# Patient Record
Sex: MALE | Race: WHITE | NOT HISPANIC OR LATINO | Employment: FULL TIME | URBAN - METROPOLITAN AREA
[De-identification: names, ages, dates, MRNs, and addresses within clinical notes are randomized per-mention and may not be internally consistent; named-entity substitution may affect disease eponyms.]

---

## 2021-10-12 ENCOUNTER — APPOINTMENT (OUTPATIENT)
Dept: GENERAL RADIOLOGY | Facility: CLINIC | Age: 21
End: 2021-10-12
Attending: EMERGENCY MEDICINE
Payer: COMMERCIAL

## 2021-10-12 ENCOUNTER — NURSE TRIAGE (OUTPATIENT)
Dept: NURSING | Facility: CLINIC | Age: 21
End: 2021-10-12

## 2021-10-12 ENCOUNTER — HOSPITAL ENCOUNTER (OUTPATIENT)
Facility: CLINIC | Age: 21
Setting detail: OBSERVATION
Discharge: HOME | End: 2021-10-13
Attending: EMERGENCY MEDICINE | Admitting: NURSE PRACTITIONER
Payer: COMMERCIAL

## 2021-10-12 DIAGNOSIS — Z20.822 CONTACT WITH AND (SUSPECTED) EXPOSURE TO COVID-19: ICD-10-CM

## 2021-10-12 DIAGNOSIS — J93.83 SPONTANEOUS PNEUMOTHORAX: ICD-10-CM

## 2021-10-12 LAB
ANION GAP SERPL CALCULATED.3IONS-SCNC: 2 MMOL/L (ref 3–14)
BASOPHILS # BLD AUTO: 0 10E3/UL (ref 0–0.2)
BASOPHILS NFR BLD AUTO: 1 %
BUN SERPL-MCNC: 13 MG/DL (ref 7–30)
CALCIUM SERPL-MCNC: 8.8 MG/DL (ref 8.5–10.1)
CHLORIDE BLD-SCNC: 108 MMOL/L (ref 94–109)
CO2 SERPL-SCNC: 29 MMOL/L (ref 20–32)
CREAT SERPL-MCNC: 1.22 MG/DL (ref 0.66–1.25)
CRP SERPL-MCNC: <2.9 MG/L (ref 0–8)
EOSINOPHIL # BLD AUTO: 0.2 10E3/UL (ref 0–0.7)
EOSINOPHIL NFR BLD AUTO: 3 %
ERYTHROCYTE [DISTWIDTH] IN BLOOD BY AUTOMATED COUNT: 12 % (ref 10–15)
ERYTHROCYTE [SEDIMENTATION RATE] IN BLOOD BY WESTERGREN METHOD: 7 MM/HR (ref 0–15)
GFR SERPL CREATININE-BSD FRML MDRD: 83 ML/MIN/1.73M2
GLUCOSE BLD-MCNC: 92 MG/DL (ref 70–99)
HCT VFR BLD AUTO: 42.2 % (ref 40–53)
HGB BLD-MCNC: 14.6 G/DL (ref 13.3–17.7)
HOLD SPECIMEN: NORMAL
IMM GRANULOCYTES # BLD: 0 10E3/UL
IMM GRANULOCYTES NFR BLD: 0 %
LYMPHOCYTES # BLD AUTO: 1.4 10E3/UL (ref 0.8–5.3)
LYMPHOCYTES NFR BLD AUTO: 26 %
MCH RBC QN AUTO: 31.5 PG (ref 26.5–33)
MCHC RBC AUTO-ENTMCNC: 34.6 G/DL (ref 31.5–36.5)
MCV RBC AUTO: 91 FL (ref 78–100)
MONOCYTES # BLD AUTO: 0.5 10E3/UL (ref 0–1.3)
MONOCYTES NFR BLD AUTO: 9 %
NEUTROPHILS # BLD AUTO: 3.2 10E3/UL (ref 1.6–8.3)
NEUTROPHILS NFR BLD AUTO: 61 %
NRBC # BLD AUTO: 0 10E3/UL
NRBC BLD AUTO-RTO: 0 /100
PLATELET # BLD AUTO: 297 10E3/UL (ref 150–450)
POTASSIUM BLD-SCNC: 3.9 MMOL/L (ref 3.4–5.3)
RBC # BLD AUTO: 4.64 10E6/UL (ref 4.4–5.9)
SARS-COV-2 RNA RESP QL NAA+PROBE: NEGATIVE
SODIUM SERPL-SCNC: 139 MMOL/L (ref 133–144)
TROPONIN I SERPL-MCNC: <0.015 UG/L (ref 0–0.04)
WBC # BLD AUTO: 5.2 10E3/UL (ref 4–11)

## 2021-10-12 PROCEDURE — 86140 C-REACTIVE PROTEIN: CPT | Performed by: EMERGENCY MEDICINE

## 2021-10-12 PROCEDURE — G0378 HOSPITAL OBSERVATION PER HR: HCPCS

## 2021-10-12 PROCEDURE — 80048 BASIC METABOLIC PNL TOTAL CA: CPT | Performed by: EMERGENCY MEDICINE

## 2021-10-12 PROCEDURE — 93010 ELECTROCARDIOGRAM REPORT: CPT | Performed by: EMERGENCY MEDICINE

## 2021-10-12 PROCEDURE — 85025 COMPLETE CBC W/AUTO DIFF WBC: CPT | Performed by: EMERGENCY MEDICINE

## 2021-10-12 PROCEDURE — 99285 EMERGENCY DEPT VISIT HI MDM: CPT | Mod: 25 | Performed by: EMERGENCY MEDICINE

## 2021-10-12 PROCEDURE — 71046 X-RAY EXAM CHEST 2 VIEWS: CPT

## 2021-10-12 PROCEDURE — 36415 COLL VENOUS BLD VENIPUNCTURE: CPT | Performed by: EMERGENCY MEDICINE

## 2021-10-12 PROCEDURE — 85652 RBC SED RATE AUTOMATED: CPT | Performed by: EMERGENCY MEDICINE

## 2021-10-12 PROCEDURE — U0003 INFECTIOUS AGENT DETECTION BY NUCLEIC ACID (DNA OR RNA); SEVERE ACUTE RESPIRATORY SYNDROME CORONAVIRUS 2 (SARS-COV-2) (CORONAVIRUS DISEASE [COVID-19]), AMPLIFIED PROBE TECHNIQUE, MAKING USE OF HIGH THROUGHPUT TECHNOLOGIES AS DESCRIBED BY CMS-2020-01-R: HCPCS | Performed by: EMERGENCY MEDICINE

## 2021-10-12 PROCEDURE — C9803 HOPD COVID-19 SPEC COLLECT: HCPCS | Performed by: EMERGENCY MEDICINE

## 2021-10-12 PROCEDURE — 84484 ASSAY OF TROPONIN QUANT: CPT | Performed by: EMERGENCY MEDICINE

## 2021-10-12 PROCEDURE — 93005 ELECTROCARDIOGRAM TRACING: CPT | Performed by: EMERGENCY MEDICINE

## 2021-10-12 PROCEDURE — 71046 X-RAY EXAM CHEST 2 VIEWS: CPT | Mod: 26 | Performed by: RADIOLOGY

## 2021-10-12 RX ORDER — IBUPROFEN 200 MG
600 TABLET ORAL EVERY 6 HOURS PRN
COMMUNITY
End: 2022-01-06

## 2021-10-12 ASSESSMENT — ENCOUNTER SYMPTOMS: SHORTNESS OF BREATH: 1

## 2021-10-12 NOTE — ED PROVIDER NOTES
ED Provider Note  [facility]      History     Chief Complaint   Patient presents with     Chest Pain     The history is provided by the patient.     Trevor Cardenas is a 21 year old male who has no significant medical history presents to the ED with c/o persistent left-sided chest pain beginning 2 days ago.  Patient reports experiencing bilateral chest pain, sweating, and tingling in fingers when he woke up 2 days ago.  Patient reports laying down for a while after this episode and symptoms diminished.  Patient states that right sided chest pain has ceased left-sided chest pain has persisted.  Patient states that pain relieves while sitting up and leaning forward.  Patient states that pain worsens when lying down left side.  Patient describes the pain as mild, dull pressure which feels constricting.  Patient states that pain radiates to left side of his back.  Patient reports taking 3 tablets of ibuprofen per day for pain, without relief.  Patient states he was recently sick with a cold 1 week ago and reports a negative COVID-19 test result during this time.  He denies feeling cold symptoms today. Patient denies experiencing any fevers.  Patient denies any chronic medical problems or history of cardiac issues in the family.  Patient denies history of PE or DVT.  Denies traveling for an extensive period of time recently.  Patient reports taking Claritin daily.  Patient reports smoking cannabis daily.     Past Medical History  No past medical history on file.  No past surgical history on file.  ibuprofen (ADVIL/MOTRIN) 200 MG tablet      No Known Allergies  Family History  No family history on file.  Social History   Social History     Tobacco Use     Smoking status: Not on file   Substance Use Topics     Alcohol use: Not on file     Drug use: Not on file      Past medical history, past surgical history, medications, allergies, family history, and social history were reviewed with  the patient. No additional pertinent items.       Review of Systems   Respiratory: Positive for shortness of breath.    Cardiovascular: Positive for chest pain.   All other systems reviewed and are negative.    A complete review of systems was performed with pertinent positives and negatives noted in the HPI, and all other systems negative.    Physical Exam   BP: (!) 157/86  Pulse: 97  Temp: 98.7  F (37.1  C)  Resp: 18  Weight: 65.8 kg (145 lb)  SpO2: 97 %  Physical Exam  Constitutional:       General: He is not in acute distress.     Appearance: He is well-developed.   HENT:      Head: Normocephalic and atraumatic.   Cardiovascular:      Rate and Rhythm: Normal rate and regular rhythm.      Heart sounds: Normal heart sounds. No friction rub.   Pulmonary:      Effort: Pulmonary effort is normal.      Breath sounds: Normal breath sounds.   Abdominal:      Palpations: Abdomen is soft.      Tenderness: There is no abdominal tenderness. There is no guarding or rebound.   Musculoskeletal:         General: Normal range of motion.      Cervical back: Normal range of motion.   Skin:     General: Skin is warm.   Neurological:      General: No focal deficit present.      Mental Status: He is alert and oriented to person, place, and time.         ED Course     5:48 PM  The patient was seen and examined by Toñito Dueñas MD in Room ED26.     Procedures            EKG Interpretation:      Interpreted by Toñito Dueñas MD  Time reviewed: 1720  Symptoms at time of EKG: CP   Rhythm: normal sinus   Rate: 86  Axis: Normal  Ectopy: none  Conduction: right bundle branch block (incomplete)  ST Segments/ T Waves: No acute ischemic changes  Q Waves: none  Comparison to prior: No old EKG available    Clinical Impression: normal EKG       Results for orders placed or performed during the hospital encounter of 10/12/21   XR Chest 2 Views     Status: None    Narrative    EXAM: XR CHEST 2 VW  LOCATION: [facility]  MEDICAL CENTER  DATE/TIME: 10/12/2021 6:20 PM    INDICATION: chest pain  COMPARISON: None.      Impression    IMPRESSION:   Small left apical pneumothorax measuring 17 mm in diameter. No significant mediastinal shift.    The chest is otherwise negative. No displaced rib fracture, focal pulmonary infiltrate or pleural effusion.    NOTE: ABNORMAL REPORT    THE DICTATION ABOVE DESCRIBES AN ABNORMALITY FOR WHICH FOLLOW-UP IS NEEDED.    Troponin I (now + 6 & 12 hrs)     Status: Normal   Result Value Ref Range    Troponin I <0.015 0.000 - 0.045 ug/L   Extra Blue Top Tube     Status: None   Result Value Ref Range    Hold Specimen JIC    Extra Red Top Tube     Status: None   Result Value Ref Range    Hold Specimen JIC    Extra Purple Top Tube     Status: None   Result Value Ref Range    Hold Specimen JIC    Basic metabolic panel     Status: Abnormal   Result Value Ref Range    Sodium 139 133 - 144 mmol/L    Potassium 3.9 3.4 - 5.3 mmol/L    Chloride 108 94 - 109 mmol/L    Carbon Dioxide (CO2) 29 20 - 32 mmol/L    Anion Gap 2 (L) 3 - 14 mmol/L    Urea Nitrogen 13 7 - 30 mg/dL    Creatinine 1.22 0.66 - 1.25 mg/dL    Calcium 8.8 8.5 - 10.1 mg/dL    Glucose 92 70 - 99 mg/dL    GFR Estimate 83 >60 mL/min/1.73m2   CRP inflammation     Status: Normal   Result Value Ref Range    CRP Inflammation <2.9 0.0 - 8.0 mg/L   Erythrocyte sedimentation rate auto     Status: Normal   Result Value Ref Range    Erythrocyte Sedimentation Rate 7 0 - 15 mm/hr   CBC with platelets and differential     Status: None   Result Value Ref Range    WBC Count 5.2 4.0 - 11.0 10e3/uL    RBC Count 4.64 4.40 - 5.90 10e6/uL    Hemoglobin 14.6 13.3 - 17.7 g/dL    Hematocrit 42.2 40.0 - 53.0 %    MCV 91 78 - 100 fL    MCH 31.5 26.5 - 33.0 pg    MCHC 34.6 31.5 - 36.5 g/dL    RDW 12.0 10.0 - 15.0 %    Platelet Count 297 150 - 450 10e3/uL    % Neutrophils 61 %    % Lymphocytes 26 %    % Monocytes 9 %    % Eosinophils 3 %    % Basophils 1 %    % Immature Granulocytes  0 %    NRBCs per 100 WBC 0 <1 /100    Absolute Neutrophils 3.2 1.6 - 8.3 10e3/uL    Absolute Lymphocytes 1.4 0.8 - 5.3 10e3/uL    Absolute Monocytes 0.5 0.0 - 1.3 10e3/uL    Absolute Eosinophils 0.2 0.0 - 0.7 10e3/uL    Absolute Basophils 0.0 0.0 - 0.2 10e3/uL    Absolute Immature Granulocytes 0.0 <=0.0 10e3/uL    Absolute NRBCs 0.0 10e3/uL   EKG 12 lead     Status: None (Preliminary result)   Result Value Ref Range    Systolic Blood Pressure  mmHg    Diastolic Blood Pressure  mmHg    Ventricular Rate 86 BPM    Atrial Rate 86 BPM    AL Interval 148 ms    QRS Duration 110 ms     ms    QTc 430 ms    P Axis 79 degrees    R AXIS 53 degrees    T Axis 64 degrees    Interpretation ECG       Sinus rhythm  Incomplete right bundle branch block  Borderline ECG     [place] Draw     Status: None    Narrative    The following orders were created for panel order [place] Draw.  Procedure                               Abnormality         Status                     ---------                               -----------         ------                     Extra Blue Top Tube[[id number]]                              Final result               Extra Red Top Tube[[id number]]                               Final result               Extra Purple Top Tube[[id number]]                            Final result                 Please view results for these tests on the individual orders.   CBC with platelets differential     Status: None    Narrative    The following orders were created for panel order CBC with platelets differential.  Procedure                               Abnormality         Status                     ---------                               -----------         ------                     CBC with platelets and d...[[id number]]                      Final result                 Please view results for these tests on the individual orders.     Medications - No data to display     Assessments & Plan (with Medical Decision Making)    Patient nontoxic in no acute distress presents for chest pain.  He is young otherwise healthy denies any cardiovascular or PE risk factors.  Exam is benign.  EKG was sinus rhythm with an incomplete right bundle no signs for ischemic changes.  Laboratory work was obtained and unremarkable.  Thought he could possibly have with his recent viral illness pericarditis but no signs on EKG ESR and CRP are negative troponin negative.  Chest x-ray however does show a small apical left-sided pneumothorax.  Patient is vitally stable not hypoxic in no respiratory distress.  Patient will be placed on oxygen discussed with ED observation and pulmonology for repeat imaging in the morning oxygen overnight and monitoring.    I have reviewed the nursing notes. I have reviewed the findings, diagnosis, plan and need for follow up with the patient.    New Prescriptions    No medications on file       Final diagnoses:   Spontaneous pneumothorax     IAnup, am serving as a trained medical scribe to document services personally performed by Toñito Dueñas MD, based on the provider's statements to me.      IToñito MD, was physically present and have reviewed and verified the accuracy of this note documented by Anup Truong.  --  Toñito Dueñas MD  [facility] ED EMERGENCY DEPARTMENT  10/12/2021     Toñito Dueñas MD  10/12/21 2031

## 2021-10-12 NOTE — TELEPHONE ENCOUNTER
Triage Call: Chest pain started on Sunday, sharp on both sides. On Sunday sweating, struggling to breath, hands got tingly- no longer feeling like that. Not as severe but still persisting. Pain is pressure like. Per protocol disposition patient was advised to call 911, patient declined calling 911 and stated he will have someone drive him into the ED as soon as possible.    COVID 19 Nurse Triage Plan/Patient Instructions    Please be aware that novel coronavirus (COVID-19) may be circulating in the community. If you develop symptoms such as fever, cough, or SOB or if you have concerns about the presence of another infection including coronavirus (COVID-19), please contact your health care provider or visit [email or web address].     Disposition/Instructions    Call to EMS/911 recommended. Follow protocol based instructions.     Bring Your Own Device:  Please also bring your smart device(s) (smart phones, tablets, laptops) and their charging cables for your personal use and to communicate with your care team during your visit.    Thank you for taking steps to prevent the spread of this virus.  o Limit your contact with others.  o Wear a simple mask to cover your cough.  o Wash your hands well and often.    Resources    [facility]: About COVID-19: [email or web address]    CDC: What to Do If You're Sick: www.cdc.gov/coronavirus/2019-ncov/about/steps-when-sick.html    CDC: Ending Home Isolation: www.cdc.gov/coronavirus/2019-ncov/hcp/disposition-in-home-patients.html     CDC: Caring for Someone: www.cdc.gov/coronavirus/2019-ncov/if-you-are-sick/care-for-someone.html     [facility]: Interim Guidance for Hospital Discharge to Home: www.health.[facility].[place]/diseases/coronavirus/hcp/hospdischarge.pdf    [facility] clinical trials (COVID-19 research studies): [email or web address]     Below are the COVID-19 hotlines at the [place] Department of Health ([facility]). Interpreters  are available.   o For health questions: Call 043-490-6707 or 1-664.707.7330 (7 a.m. to 7 p.m.)  o For questions about schools and childcare: Call 922-818-6299 or 1-267.375.5003 (7 a.m. to 7 p.m.)     Adri Montiel RN Nursing Advisor 10/12/2021 3:41 PM     Reason for Disposition    [1] Chest pain lasts > 5 minutes AND [2] described as crushing, pressure-like, or heavy    Additional Information    Negative: Severe difficulty breathing (e.g., struggling for each breath, speaks in single words)    Negative: Difficult to awaken or acting confused (e.g., disoriented, slurred speech)    Negative: Shock suspected (e.g., cold/pale/clammy skin, too weak to stand, low BP, rapid pulse)    Negative: [1] Chest pain lasts > 5 minutes AND [2] history of heart disease  (i.e., heart attack, bypass surgery, angina, angioplasty, CHF; not just a heart murmur)    Protocols used: CHEST PAIN-A-AH

## 2021-10-12 NOTE — ED TRIAGE NOTES
21 yr old male ambulatory to triage presenting with constant left sided chest pain since Sunday morning described as dull and pressure. Also reports left shoulder pain since chest pain started. Episode Sunday morning with SOB, diaphoresis, and tingling which lasted about 10 minutes. Since that one episode has not felt SOB or diaphoretic or tingly. Pt went to work today. Fully vaccinated for Covid. Had Covid in August 2020 - no complications. No history of DVT or PE. Occasional palpitations -- twice per day, last seconds maybe minutes per pt.

## 2021-10-13 ENCOUNTER — APPOINTMENT (OUTPATIENT)
Dept: GENERAL RADIOLOGY | Facility: CLINIC | Age: 21
End: 2021-10-13
Attending: NURSE PRACTITIONER
Payer: COMMERCIAL

## 2021-10-13 VITALS
HEART RATE: 62 BPM | WEIGHT: 148.15 LBS | RESPIRATION RATE: 18 BRPM | OXYGEN SATURATION: 100 % | BODY MASS INDEX: 19.63 KG/M2 | TEMPERATURE: 98.2 F | SYSTOLIC BLOOD PRESSURE: 116 MMHG | DIASTOLIC BLOOD PRESSURE: 66 MMHG | HEIGHT: 73 IN

## 2021-10-13 LAB
ATRIAL RATE - MUSE: 86 BPM
DIASTOLIC BLOOD PRESSURE - MUSE: NORMAL MMHG
INTERPRETATION ECG - MUSE: NORMAL
P AXIS - MUSE: 79 DEGREES
PR INTERVAL - MUSE: 148 MS
QRS DURATION - MUSE: 110 MS
QT - MUSE: 360 MS
QTC - MUSE: 430 MS
R AXIS - MUSE: 53 DEGREES
SYSTOLIC BLOOD PRESSURE - MUSE: NORMAL MMHG
T AXIS - MUSE: 64 DEGREES
TROPONIN I SERPL-MCNC: <0.015 UG/L (ref 0–0.04)
VENTRICULAR RATE- MUSE: 86 BPM

## 2021-10-13 PROCEDURE — G0378 HOSPITAL OBSERVATION PER HR: HCPCS

## 2021-10-13 PROCEDURE — 36415 COLL VENOUS BLD VENIPUNCTURE: CPT | Performed by: NURSE PRACTITIONER

## 2021-10-13 PROCEDURE — 84484 ASSAY OF TROPONIN QUANT: CPT | Performed by: NURSE PRACTITIONER

## 2021-10-13 PROCEDURE — 71046 X-RAY EXAM CHEST 2 VIEWS: CPT

## 2021-10-13 PROCEDURE — 71046 X-RAY EXAM CHEST 2 VIEWS: CPT | Mod: 26 | Performed by: STUDENT IN AN ORGANIZED HEALTH CARE EDUCATION/TRAINING PROGRAM

## 2021-10-13 RX ORDER — ONDANSETRON 2 MG/ML
4 INJECTION INTRAMUSCULAR; INTRAVENOUS EVERY 6 HOURS PRN
Status: DISCONTINUED | OUTPATIENT
Start: 2021-10-13 | End: 2021-10-13 | Stop reason: HOSPADM

## 2021-10-13 RX ORDER — ONDANSETRON 4 MG/1
4 TABLET, ORALLY DISINTEGRATING ORAL EVERY 6 HOURS PRN
Status: DISCONTINUED | OUTPATIENT
Start: 2021-10-13 | End: 2021-10-13 | Stop reason: HOSPADM

## 2021-10-13 RX ORDER — LIDOCAINE 40 MG/G
CREAM TOPICAL
Status: DISCONTINUED | OUTPATIENT
Start: 2021-10-13 | End: 2021-10-13 | Stop reason: HOSPADM

## 2021-10-13 ASSESSMENT — MIFFLIN-ST. JEOR: SCORE: 1720.88

## 2021-10-13 NOTE — H&P
"[facility]    History and Physical - ED Observation       Date of Admission:  10/12/2021    Assessment & Plan      Trevor Cardenas is a 21 year old male admitted on 10/12/2021. He has no significant medical history and presents to the Emergency Dept for evaluation of chest pain.    #Pneumothorax, spontaneous  #Chest pain  #Shortness of breath  Young adult male with history of recent URI, one week ago, with negative covid test at that time. Initially pt reports chest pain was bilateral but now is only left sided. Pt denies personal or family history of heart disease. Workup in the ED significant only for a chest xray that shows left sided apical pneumothorax. Labs unremarkable, including negative troponin. EKG non-ischemic. Pt is not hypoxic. Symptoms likely attributable to pneumothorax, but will repeat troponin. ED MD discussed patient with pulmonology who advise monitoring overnight, supplemental oxygen, and repeat imaging in the morning with a pulmonology consult.   -Serial troponins  -Telemetry  -Continuous pulse ox  -Supplemental oxygen  -Chest XR, 2 Views in AM  -Interventional Pulmonology Consult       Diet:   regular  DVT Prophylaxis: Low Risk/Ambulatory with no VTE prophylaxis indicated  Soni Catheter: Not present  Central Lines: None  Code Status:   full    Disposition Plan   Expected discharge:  1-2 days, recommended to prior living arrangement once adequate pain management/ tolerating PO medications and safe disposition plan/ TCU bed available.     The patient's care was discussed with the Bedside Nurse, Patient and ED MD, Dr Dueñas.    Zahra Scott, [facility]  ED Observation, Ascom:[id number]  ______________________________________________________________________    Chief Complaint   Chest pain    History is obtained from the patient    History of Present Illness   Per ED, \"Trevor Cardenas is " "a 21 year old male who has no significant medical history presents to the ED with c/o persistent left-sided chest pain and shortness of breath beginning 2 days ago.  Patient reports experiencing bilateral chest pain, sweating, and tingling in fingers when he woke up 2 days ago.  Patient reports laying down for a while after this episode and symptoms diminished.  Patient states that right sided chest pain has ceased left-sided chest pain has persisted.  Patient states that pain relieves while sitting up and leaning forward.  Patient states that pain worsens when lying down left side.  Patient describes the pain as mild, dull pressure which feels constricting.  Patient states that pain radiates to left side of his back.  Patient reports taking 3 tablets of ibuprofen per day for pain, without relief.  Patient states he was recently sick with a cold 1 week ago and reports a negative COVID-19 test result during this time.  He denies feeling cold symptoms today. Patient denies experiencing any fevers.  Patient denies any chronic medical problems or history of cardiac issues in the family.  Patient denies history of PE or DVT.  Denies traveling for an extensive period of time recently.  Patient reports taking Claritin daily.  Patient reports smoking cannabis daily. \"    In the ED, /86,HR 97, temp 98.7, RR 18, SPO2 97% on RA. Labs unremarkable. Chest XR shows Small left apical pneumothorax measuring 17 mm in diameter. No significant mediastinal shift. Patient is not hypoxic and remains afebrile. Pulmonology was contacted and advise monitoring patient overnight, supplemental oxygen, and plan for repeat imaging in the morning with pulmonology consult.     On arrival in ED Obs, the pt was stable.    Review of Systems    Respiratory: Positive for shortness of breath.    Cardiovascular: Positive for chest pain.   All other systems reviewed and are negative.     A complete review of systems was performed with pertinent " positives and negatives noted in the HPI, and all other systems negative.    Past Medical History    I have reviewed this patient's medical history and updated it with pertinent information if needed.   No past medical history on file.    Past Surgical History   I have reviewed this patient's surgical history and updated it with pertinent information if needed.  No past surgical history on file.    Social History   I have reviewed this patient's social history and updated it with pertinent information if needed.  Social History     Tobacco Use     Smoking status: Not on file   Substance Use Topics     Alcohol use: Not on file     Drug use: Not on file       Prior to Admission Medications   Prior to Admission Medications   Prescriptions Last Dose Informant Patient Reported? Taking?   ibuprofen (ADVIL/MOTRIN) 200 MG tablet 10/12/2021 at am Self Yes Yes   Sig: Take 600 mg by mouth every 6 hours as needed for mild pain      Facility-Administered Medications: None     Allergies   No Known Allergies    Physical Exam   Vital Signs: Temp: 98.7  F (37.1  C) Temp src: Temporal BP: 107/64 Pulse: 64   Resp: 23 SpO2: 98 % O2 Device: None (Room air)    Weight: 145 lbs 0 oz    Constitutional: awake, alert, cooperative, no apparent distress, and appears stated age  Eyes: Lids and lashes normal, pupils equal, round and reactive to light, extra ocular muscles intact, sclera clear, conjunctiva normal  ENT: Normocephalic, atraumatic, external ears without lesions, oral pharynx with moist mucous membranes,  gums normal and good dentition.  Respiratory: No increased work of breathing, good air exchange, clear to auscultation bilaterally, no crackles or wheezing  Cardiovascular: Normal apical impulse, regular rate and rhythm, normal S1 and S2, no S3 or S4, and no murmur noted  Abdomen: Normal bowel sounds, soft, non-distended, non-tender, no masses palpated, no hepatosplenomegally  Skin: normal skin color, texture, turgor and no redness,  warmth, or swelling  Musculoskeletal: There is no redness, warmth, or swelling of the joints.  Full range of motion noted.  Motor strength is 5 out of 5 all extremities bilaterally.  Tone is normal.  Neurologic: Awake, alert, oriented to name, place and time.  Cranial nerves II-XII are grossly intact.  Motor is 5 out of 5 bilaterally.      Data   Data reviewed today: I reviewed all medications, new labs and imaging results over the last 24 hours. I personally reviewed the EKG and chest xray image(s) showing RBBB and pneumothorax.  EKG  Interpreted by Toñito Dueñas MD  Time reviewed: 1720  Symptoms at time of EKG: CP   Rhythm: normal sinus   Rate: 86  Axis: Normal  Ectopy: none  Conduction: right bundle branch block (incomplete)  ST Segments/ T Waves: No acute ischemic changes  Q Waves: none  Comparison to prior: No old EKG available     Clinical Impression: normal EKG    Recent Labs   Lab 10/12/21  1754 10/12/21  1710   WBC  --  5.2   HGB  --  14.6   MCV  --  91   PLT  --  297     --    POTASSIUM 3.9  --    CHLORIDE 108  --    CO2 29  --    BUN 13  --    CR 1.22  --    ANIONGAP 2*  --    ABDIEL 8.8  --    GLC 92  --    TROPONIN  --  <0.015     Recent Results (from the past 24 hour(s))   XR Chest 2 Views    Narrative    EXAM: XR CHEST 2 VW  LOCATION: [facility]  DATE/TIME: 10/12/2021 6:20 PM    INDICATION: chest pain  COMPARISON: None.      Impression    IMPRESSION:   Small left apical pneumothorax measuring 17 mm in diameter. No significant mediastinal shift.    The chest is otherwise negative. No displaced rib fracture, focal pulmonary infiltrate or pleural effusion.    NOTE: ABNORMAL REPORT    THE DICTATION ABOVE DESCRIBES AN ABNORMALITY FOR WHICH FOLLOW-UP IS NEEDED.

## 2021-10-13 NOTE — PROGRESS NOTES
-diagnostic tests and consults completed and resulted - not met   -vital signs normal or at patient baseline - met, VSS  -tolerating oral intake to maintain hydration - met   -adequate pain control on oral analgesics - pt declined intervention  -dyspnea improved and O2 sats greater than 88% on room air or prior home oxygen levels - met   -returns to baseline functional status - in progress

## 2021-10-13 NOTE — PROGRESS NOTES
Patient's After Visit Summary was reviewed with patient and/or self.   Patient verbalized understanding of After Visit Summary, recommended follow up and was given an opportunity to ask questions.   Discharge medications sent home with patient/family: YES   Discharged with father

## 2021-10-13 NOTE — DISCHARGE SUMMARY
Discharge Summary    Trevor Cardenas MRN# [id number]   YOB: 2000 Age: 21 year old     Date of Admission:  10/12/2021  Date of Discharge:  10/13/2021  Admitting Physician:  Zahra Scott CNP  Discharge Physician:  Rhiannon Alonzo MD  Discharging Service:  Emergency Department Observation Unit     Primary Provider: No Ref-Primary, Physician          Discharge Diagnosis:     Spontaneous pneumothorax    * No resolved hospital problems. *               Discharge Disposition:   Discharged to home           Condition on Discharge:   Discharge condition: Stable   Code status on discharge: Full Code           Procedures:   Imaging performed:   Chest x-ray             Discharge Medications:     Current Discharge Medication List      CONTINUE these medications which have NOT CHANGED    Details   ibuprofen (ADVIL/MOTRIN) 200 MG tablet Take 600 mg by mouth every 6 hours as needed for mild pain                   Consultations:   Consultation during this admission received from pulmonary medicine             Brief History of Illness:   21 year old marijuana smoker with no other PMH presents with a first-time small spontaneous Ptx.         Hospital Course:   #Chest pain  #Shortness of breath  Young adult male with history of recent URI, one week ago, with negative covid test at that time. Initially pt reports chest pain was bilateral but now is only left sided. Pt denies personal or family history of heart disease. Workup in the ED significant only for a chest xray that shows left sided apical pneumothorax. Labs unremarkable, including negative troponin. EKG non-ischemic. Risk factors include body habitus and smoking.   . ED MD discussed patient with pulmonology who advise monitoring overnight, supplemental oxygen, and repeat imaging in the morning with a pulmonology consult. Size of ptx stable on chest xray 12 hours apart. Pulmonology recommended:   - If pain persists after 2 weeks, repeat  "CXR with PCP (referral will be placed from ER)  - Return to ER if worsening CP, SOB, lightheadedness, other concerns  - Precautions (discussed with patient and father):               Avoid changes in pressure x 3 weeks (air travel, deep sea diving, etc)               Smoking cessation - heavily emphasized no more smoking from [facility]               OK to continue regular physical activity  - Discussed with pt that if a second episode occurs, he  would be a candidate for pleurodesis procedures  - referral placed for primary care at discharge.             Final Day of Progress before Discharge:       Physical Exam:  Blood pressure 116/66, pulse 62, temperature 98.2  F (36.8  C), temperature source Oral, resp. rate 18, height 1.854 m (6' 1\"), weight 67.2 kg (148 lb 2.4 oz), SpO2 100 %.    EXAM:  Constitutional: awake, alert, cooperative, no apparent distress, and appears stated age  Eyes: Lids and lashes normal, pupils equal, round and reactive to light, extra ocular muscles intact, sclera clear, conjunctiva normal  ENT: Normocephalic, atraumatic, external ears without lesions, oral pharynx with moist mucous membranes,  gums normal and good dentition.  Respiratory: No increased work of breathing, good air exchange, clear to auscultation bilaterally, no crackles or wheezing  Cardiovascular: Normal apical impulse, regular rate and rhythm, normal S1 and S2, no S3 or S4, and no murmur noted  Abdomen: Normal bowel sounds, soft, non-distended, non-tender, no masses palpated, no hepatosplenomegally  Skin: normal skin color, texture, turgor and no redness, warmth, or swelling  Musculoskeletal: There is no redness, warmth, or swelling of the joints.  Full range of motion noted.  Motor strength is 5 out of 5 all extremities bilaterally.  Tone is normal.  Neurologic: Awake, alert, oriented to name, place and time         Data:  All laboratory data reviewed             Significant Results:   None  Lab Results   Component Value Date    " WBC 5.2 10/12/2021    HGB 14.6 10/12/2021    HCT 42.2 10/12/2021     10/12/2021     10/12/2021    POTASSIUM 3.9 10/12/2021    CHLORIDE 108 10/12/2021    CO2 29 10/12/2021    BUN 13 10/12/2021    CR 1.22 10/12/2021    GLC 92 10/12/2021    SED 7 10/12/2021    TROPONIN <0.015 10/13/2021      Recent Results (from the past 48 hour(s))   XR Chest 2 Views    Narrative    EXAM: XR CHEST 2 VW  LOCATION: [facility]  DATE/TIME: 10/12/2021 6:20 PM    INDICATION: chest pain  COMPARISON: None.      Impression    IMPRESSION:   Small left apical pneumothorax measuring 17 mm in diameter. No significant mediastinal shift.    The chest is otherwise negative. No displaced rib fracture, focal pulmonary infiltrate or pleural effusion.    NOTE: ABNORMAL REPORT    THE DICTATION ABOVE DESCRIBES AN ABNORMALITY FOR WHICH FOLLOW-UP IS NEEDED.    XR Chest 2 Views    Narrative    Exam: XR CHEST 2 VW, 10/13/2021 8:34 AM    Indication: left apical pneumothorax with chest pain and SOB. Please  compare to previous.    Comparison: Chest x-ray 10/12/2021    Findings:     Frontal and lateral view x-ray of the chest. Redemonstration of left  apical pneumothorax, not significantly changed compared to prior. No  acute airspace opacity. Cardiomediastinal silhouette is within normal  limits. Visualized osseous structures and upper abdomen is within  normal limits      Impression    Impression:   1. Small left apical pneumothorax, not significantly changed compared  to prior chest x-ray 10/12/2021.  2. Unchanged cardiomediastinal silhouette.    ISHAN HICKS MD         SYSTEM ID:  [id number]                Pending Results:   Unresulted Labs Ordered in the Past 30 Days of this Admission     No orders found for last 31 day(s).                  Discharge Instructions and Follow-Up:     Discharge Procedure Orders   Primary Care Referral   Standing Status: Future   Referral Priority: Routine Referral  Type: Consultation     Reason for your hospital stay   Order Comments: Spontaneous Pneumothorax     Activity   Order Comments: Your activity upon discharge: activity as tolerated     Order Specific Question Answer Comments   Is discharge order? Yes      When to contact your care team   Order Comments: Call 911 if any of these occur.    Trouble breathing    Confusion or difficulty arousing    Fainting or loss of consciousness    Rapid heart rate    Passing out or fainting    New pain in the chest, arm, shoulder, neck, or upper back    Call your healthcare provider right away if any of these occur:    Increased pain with breathing    Weakness or dizziness    Fever    Coughing up sputum     Discharge Instructions   Order Comments: You were admitted to the ED observa chest pain. A chest xray was completed and showed a small left sided apical pneumothorax. A repeat chest xray showed no change in size of the pneumothorax. You were seen by pulmonology who did not recommend any further testing or procedures. Recommended not to go in the mountains, ride in an airplane or go scuba diving for the next three weeks.    Return to the ED/Urgent care if increased chest pain or increased shortness of breath.     Recommend follow up with a primary care doctor in one week for hospital follow up.     Follow Up ([facility])   Order Comments: Follow up with primary care provider, Physician No Ref-Primary, within 7 days for hospital follow- up.  No follow up labs or test are needed.      Appointments on [place] and/or [facility] (with [facility] or [facility] provider or service). Call [phone] if you haven't heard regarding these appointments within 7 days of discharge.     Diet   Order Comments: Follow this diet upon discharge: Regular     Order Specific Question Answer Comments   Is discharge order? Yes           Attestation:  TERESA Scott CNP.    Time spent on patient: 30 minutes total including face to face and coordinating  care time reviewing current illness, any medication changes, and the care plan for today.

## 2021-10-13 NOTE — PLAN OF CARE
"/80   Pulse 71   Temp 98.4  F (36.9  C) (Oral)   Resp 18   Ht 1.854 m (6' 1\")   Wt 67.2 kg (148 lb 2.4 oz)   SpO2 100%   BMI 19.55 kg/m      -diagnostic tests and consults completed and resulted - not met   -vital signs normal or at patient baseline - met, VSS  -tolerating oral intake to maintain hydration - met   -adequate pain control on oral analgesics - pt declined intervention  -dyspnea improved and O2 sats greater than 88% on room air or prior home oxygen levels - met   -returns to baseline functional status - in progress  Nurse to notify provider when observation goals have been met and patient is ready for discharge  "

## 2021-10-13 NOTE — CONSULTS
[facility]  Pulmonary Consult     Patient:  Trevor Cardenas, Date of birth 2000, Medical record number [id number]  Date of Visit:  10/13/2021    Reason for Consult: spontaneous ptx         Assessment and Recommendations:     21 year old marijuana smoker with no other PMH presents with a first-time small spontaneous Ptx. Risk factors include body habitus and smoking. Size of ptx stable on chest xray 12 hours apart.     - OK to discharge pt from a pulm perspective  - NSAIDS for chest pain  - If pain persists after 14 days, repeat CXR with PCP (referral will be placed from ER)  - Return to ER if worsening CP, SOB, lightheadedness, other concerns  - Precautions (discussed with patient and father):   Avoid changes in pressure x 3 weeks (air travel, deep sea diving, etc)   Smoking cessation - heavily emphasized no more smoking from bong   OK to continue regular physical activity  - Discussed with pt that if a second episode occurs, he would be a candidate for pleurodesis procedures    Thank you very much for this consultation.    Patient was discussed with Dr. Smith.     Delphine Pugh  Pulmonary and Critical Care Fellow  [phone]    Attending note:  Patient discussed with Dr. Pugh. All data reviewed. Agree with the assessment and plan as outlined in the above note.  Spontaneous pneumonothorax, history of marijuana use.  Pneumothorax is stable on repeat CXR. Discussed eliminating risk factors for recurrent pneumothorax and close follow-up.  Post pneumothorax instructions for limitations discussed with patient.    Elly Smith MD  [phone]        History of Present Illness     21 year old healthy male presents with 2 days of pleuritic chest pain. URI syptoms about 1 week ago which have since resolved. No recent coughing or altitude changes. He does smoke MJ via a bong. Previously smoked cigarettes. No vaping. No history of lung disease.     Review of Systems:  10 point ROS  completed and negative except for above.    No past medical history on file.    No past surgical history on file.    No family history on file.    Social History     Social History Narrative     Not on file     Social History     Tobacco Use     Smoking status: Not on file   Substance Use Topics     Alcohol use: Not on file     Drug use: Not on file       Patient Active Problem List   Diagnosis     Spontaneous pneumothorax            Current Medications & Allergies:       sodium chloride (PF)  3 mL Intracatheter Q8H       Infusions/Drips:      No Known Allergies         Physical Exam:   Ranges for vital signs:  Temp:  [98.2  F (36.8  C)-98.7  F (37.1  C)] 98.2  F (36.8  C)  Pulse:  [62-97] 62  Resp:  [18-24] 18  BP: (107-157)/(64-86) 116/66  SpO2:  [96 %-100 %] 100 %  Vitals:    10/12/21 1636 10/13/21 0227   Weight: 65.8 kg (145 lb) 67.2 kg (148 lb 2.4 oz)       Physical Examination:  GENERAL:  Lying in bed, no acute distress, working on computer  HEAD:  Head is normocephalic, atraumatic   EYES:  Eyes have anicteric sclerae   LUNGS:  Clear to auscultation bilaterally.   CARDIOVASCULAR:  Regular rate and rhythm   ABDOMEN:  Soft, nontender, nondistended  EXT:  No edema.    NEUROLOGIC:  Awake and alert, answering questions appropriately.  PSYCH: Normal affect.          Laboratory Data:     Imaging:  CXR 10/12/21 1827 - Small left apical pneumothorax measuring 17 mm in diameter. No significant mediastinal shift.     CXR 10/13/21 0834 - stable Ptx

## 2021-10-17 ENCOUNTER — HEALTH MAINTENANCE LETTER (OUTPATIENT)
Age: 21
End: 2021-10-17

## 2021-10-26 ASSESSMENT — ENCOUNTER SYMPTOMS
INSOMNIA: 1
NERVOUS/ANXIOUS: 1
DEPRESSION: 0
DECREASED CONCENTRATION: 1
PANIC: 0

## 2021-10-28 ENCOUNTER — OFFICE VISIT (OUTPATIENT)
Dept: INTERNAL MEDICINE | Facility: CLINIC | Age: 21
End: 2021-10-28
Payer: COMMERCIAL

## 2021-10-28 VITALS
DIASTOLIC BLOOD PRESSURE: 76 MMHG | WEIGHT: 144 LBS | SYSTOLIC BLOOD PRESSURE: 129 MMHG | RESPIRATION RATE: 16 BRPM | HEART RATE: 96 BPM | BODY MASS INDEX: 19.09 KG/M2 | HEIGHT: 73 IN | OXYGEN SATURATION: 97 %

## 2021-10-28 DIAGNOSIS — J93.11 PRIMARY SPONTANEOUS PNEUMOTHORAX: Primary | ICD-10-CM

## 2021-10-28 PROCEDURE — 99203 OFFICE O/P NEW LOW 30 MIN: CPT | Mod: GC

## 2021-10-28 ASSESSMENT — PAIN SCALES - GENERAL: PAINLEVEL: NO PAIN (0)

## 2021-10-28 ASSESSMENT — MIFFLIN-ST. JEOR: SCORE: 1702.06

## 2021-10-28 NOTE — PROGRESS NOTES
PRIMARY CARE CENTER         HPI:      HPI:  Trevor Cardenas is a 21 year old male who presents for the following  Patient presents with: Establish Care and Hospital F/U (Had partially collapsed lung, healed itself over time, would like flu shot)    This is the first time I am seeing this patient.  Patient states he stopped feeling chest pain last week, no SOB, no cough or wheeze. Sxs have completely resolved.     He had covid 1.5 yrs ago, was very mild and resolved after 10 days. Got Moderna in May 2021. Will get booster when he is 6 months past receiving vaccination.     States he has not been to the doctor recently. He does not have any medical problems or concerns at this time.     Denies fevers, chills, chest pain, shortness of breath, palpitations, cough, wheeze, abdominal pain, nausea, vomiting, diarrhea, dizziness, headaches.       Social hx:  Smoking: Was daily cannabis smoker, has not smoked since he was diagnosed with pneumothorax in ED a few weeks ago. Used to smoke joints with tobacco  ETOH: socially 2-3x/week. 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      Allergies:   No Known Allergies    Medical History:  No past medical history on file.    Surgical History:  none    Family History:  High cholerstrol on father's side  Chronic depression in dad and brother    Problem, Medication and Allergy Lists were reviewed and are current.  Patient is a new patient to this clinic and so  I reviewed/updated the Past Medical History, the Family History and the Social History.          Review of Systems:     5 point ROS negative unless otherwise specified in  "HPI  ROS  I have personally reviewed and updated the complete ROS on the day of the visit.           Physical Exam:   /76 (BP Location: Right arm, Patient Position: Sitting, Cuff Size: Adult Regular)   Pulse 96   Resp 16   Ht 1.854 m (6' 1\")   Wt 65.3 kg (144 lb)   SpO2 97%   BMI 19.00 kg/m    Body mass index is 19 kg/m .  Vitals were reviewed    Physical Exam:   General: Sitting upright, talking in complete sentences, non-distressed, well-groomed, tall and thin appearing  HEENT: Normocephalic, atraumatic, EOMI, no conjunctival pallor, anicteric  CVS: S1/S2 WNL, RRR, no murmur  Pulm: Non-labored breathing, CTAB, good air movement throughout all lung fields, no crackles or wheeze  Abdo: Non-distended, non-tender to palpation, no rebound or guarding, BS present   MSK: No obvious bony deformities, no clubbing  Skin: Warm and dry, no obvious rashes  Neuro: Alert and oriented x3, fluent speech, moves all extremities spontaneously.  Psych: Normal mood and affect       Results:      Results from the last 24 hoursNo results found for this or any previous visit (from the past 24 hour(s)).  Assessment and Plan     Trevor was seen today for [facility] care and hospital f/u.    Diagnoses and all orders for this visit:    Primary spontaneous pneumothorax  Patient diagnosed with primary spontaneous pneumothorax on October 12 after presenting to [facility] ED.  Today, patient is asymptomatic.  Denies chest pain, shortness of breath, cough, wheeze.  States his symptoms have completely resolved.  Also states he has stopped smoking cannabis.  Pneumothorax possibly attributed to heavy smoking.  Encouraged continued smoking cessation.  Silvano obtain chest x-ray in 2 to 3 weeks to ensure pneumothorax and blood resolution.         -Patient should RTC if he develops any new chest pain, shortness of breath or other symptoms (or if he has any additional concerns)  -     XR Chest 2 Views; Future    Additional Issues:  Patient has not " seen a primary care physician in quite some time.  Recommended he schedule a physical examination within the next year or so to discuss healthcare maintenance and screenings.     Options for treatment and follow-up care were reviewed with the patient. Trevor Cardenas engaged in the decision making process and verbalized understanding of the options discussed and agreed with the final plan.    Pt was seen and plan of care discussed with Dr. Long.      Isi Pedroza MD  Internal Medicine-PGY1  [facility]  Pager: [phone]  Oct 28, 2021     Pt was seen and examined by me; confirmed clear lungs; I agree with the A/P documentation above    Shona Cueva MD

## 2021-10-28 NOTE — NURSING NOTE
Trevor Cardenas is a 21 year old male patient that presents today in clinic for the following:    Chief Complaint   Patient presents with     [facility] F/U     Had partially collapsed lung, healed itself over time, would like flu shot     The patient's allergies and medications were reviewed as noted. A set of vitals were recorded as noted without incident. The patient does not have any other questions for the provider.    Katirna Guerra, EMT at 10:08 AM on 10/28/2021

## 2021-10-28 NOTE — PATIENT INSTRUCTIONS
Radiology call to schedule chest x-ray:    [phone] [facility] Medication Refill Request Information:  * Please contact your pharmacy regarding ANY request for medication refills.  ** [facility] Prescription Fax = [phone]  * Please allow 3 business days for routine medication refills.  * Please allow 5 business days for controlled substance medication refills.     [facility] Test Result notification information:  *You will be notified with in 7-10 days of your appointment day regarding the results of your test.  If you are on MyChart you will be notified as soon as the provider has reviewed the results and signed off on them.    [facility]: [phone]

## 2021-11-03 ENCOUNTER — TRANSCRIBE ORDERS (OUTPATIENT)
Dept: OTHER | Age: 21
End: 2021-11-03

## 2021-11-03 DIAGNOSIS — K92.1 MELENA: Primary | ICD-10-CM

## 2021-11-04 ENCOUNTER — TELEPHONE (OUTPATIENT)
Dept: GASTROENTEROLOGY | Facility: CLINIC | Age: 21
End: 2021-11-04
Payer: COMMERCIAL

## 2021-11-04 NOTE — TELEPHONE ENCOUNTER
[facility] Call Center    Phone Message    May a detailed message be left on voicemail: yes     Reason for Call: Other: Patient called to schedule appt for DX: Melena and referred by Dr. Raquel Wong, stating he was told to be seen in 1-2 weeks.  Writer scheduled next available of 2/15/22 and added to wait list.  Please review records and follow up with patient, as per protocols.  Thank you!     Action Taken: Message routed to:  [facility] ([facility] Clinic Coord GI UC    Travel Screening: Not Applicable

## 2021-11-05 NOTE — TELEPHONE ENCOUNTER
REFERRAL INFORMATION:    Referring Provider: Dr. Raquel Wong     Referring Clinic:  [facility]     Reason for Visit/Diagnosis: Melena     FUTURE VISIT INFORMATION:    Appointment Date: 2/15/2022    Appointment Time: 9 AM      NOTES STATUS DETAILS   OFFICE NOTE from Referring Provider In process    OFFICE NOTE from Other Specialist N/A    HOSPITAL DISCHARGE SUMMARY/  ED VISITS N/A    OPERATIVE REPORT N/A    MEDICATION LIST Internal         ENDOSCOPY  Internal  EGD: 11/17/2021    COLONOSCOPY N/A    ERCP N/A    EUS N/A    STOOL TESTING N/A    PERTINENT LABS Internal    PATHOLOGY REPORTS (RELATED) Internal  11/17/2021   IMAGING (CT, MRI, EGD, MRCP, Small Bowel Follow Through/SBT, MR/CT Enterography) N/A      1/19/2022 9:17am Fax request sent to [facility] ([phone]) for med recs. -Bhjaspreet

## 2021-11-10 ENCOUNTER — NURSE TRIAGE (OUTPATIENT)
Dept: NURSING | Facility: CLINIC | Age: 21
End: 2021-11-10

## 2021-11-10 ENCOUNTER — TELEPHONE (OUTPATIENT)
Dept: GASTROENTEROLOGY | Facility: CLINIC | Age: 21
End: 2021-11-10
Payer: COMMERCIAL

## 2021-11-10 DIAGNOSIS — Z11.59 ENCOUNTER FOR SCREENING FOR OTHER VIRAL DISEASES: ICD-10-CM

## 2021-11-10 DIAGNOSIS — K92.1 GASTROINTESTINAL HEMORRHAGE WITH MELENA: Primary | ICD-10-CM

## 2021-11-10 NOTE — PROGRESS NOTES
Received call from Tamar re: timing of GI appointment.     Patient presented with melena in setting of NSAID use and alcohol use. Currently resolved and is on PPI.     Suggested:   -- Omeprazole 40mg BID x 8 weeks  -- EGD in next 1-2 weeks  -- If further melena, needs to come to the ED.     GI clinic visit to be determined based on EGD.     -Dr. Sarmiento

## 2021-11-10 NOTE — TELEPHONE ENCOUNTER
Screening Questions  1. Are you active on mychart?Y    2. What insurance is in the chart? [employer]    2.  Ordering/Referring Provider: Alex Sarmiento MD     3. BMI 18.7    4. Do you have any Lung issues?  N If yes continue:   Do you use daily home oxygen?    Do you have Pulmonary Hypertension?    Do you have SEVERE asthma?     5. Have you had a heart, lung, or liver transplant? N    6. Are you currently on dialysis or have chronic kidney disease? N    7. Have you had a stroke or Transient ischemic attack (TIA) within 6 months? N    8. In the past 6 months, have you had any heart related issues including cardiomyopathy or heart attack? N      If yes, did it require cardiac stenting or other implantable device?N      9. Do you have any implantable devices in your body (pacemaker, defib, LVAD)? N    10. Do you take nitroglycerin? If yes, how often? N    11. Are you currently taking any blood thinners?N    12. Are you a diabetic? N    13. (Females) Are you currently pregnant?   If yes, how many weeks?      15. Are you taking any prescription pain medications on a routine schedule? N If yes, MAC sedation.    16. Do you have any chemical dependencies such as alcohol, street drugs, or methadone? N If yes, MAC sedation.    17. Do you have any history of post-traumatic stress syndrome, severe anxiety or history of psychosis? N    18. Do you transfer independently? Y    19.  Do you have any issues with constipation? N    20. Preferred Pharmacy for Pre Prescription [id number] [place] [facility]    Scheduling Details    Which Colonoscopy Prep was Sent?:   Procedure Scheduled: EGD  Provider/Surgeon: Torsten  Date of Procedure: 11-17  Location: [facility]  Caller (Please ask for phone number if not scheduled by patient): [place]      Sedation Type: CS  Conscious Sedation- Needs  for 6 hours after the procedure  MAC/General-Needs  for 24 hours after procedure    Pre-op Required at [facility] [place] [place] and  OR for MAC sedation:   (if yes advise patient they will need a pre-op prior to procedure)      Is patient on blood thinners? -N (If yes- inform patient to follow up with PCP or provider for follow up instructions)     Informed patient they will need an adult  Y  Cannot take any type of public or medical transportation alone    [facility] or [facility]: Y    Confirmed Nurse will call to complete assessment Y    Additional comments:

## 2021-11-11 ENCOUNTER — TELEPHONE (OUTPATIENT)
Dept: GASTROENTEROLOGY | Facility: CLINIC | Age: 21
End: 2021-11-11
Payer: COMMERCIAL

## 2021-11-15 ENCOUNTER — LAB (OUTPATIENT)
Dept: LAB | Facility: CLINIC | Age: 21
End: 2021-11-15
Payer: COMMERCIAL

## 2021-11-15 DIAGNOSIS — Z11.59 ENCOUNTER FOR SCREENING FOR OTHER VIRAL DISEASES: ICD-10-CM

## 2021-11-15 PROCEDURE — U0005 INFEC AGEN DETEC AMPLI PROBE: HCPCS | Mod: 90 | Performed by: PATHOLOGY

## 2021-11-15 PROCEDURE — U0003 INFECTIOUS AGENT DETECTION BY NUCLEIC ACID (DNA OR RNA); SEVERE ACUTE RESPIRATORY SYNDROME CORONAVIRUS 2 (SARS-COV-2) (CORONAVIRUS DISEASE [COVID-19]), AMPLIFIED PROBE TECHNIQUE, MAKING USE OF HIGH THROUGHPUT TECHNOLOGIES AS DESCRIBED BY CMS-2020-01-R: HCPCS | Mod: 90 | Performed by: PATHOLOGY

## 2021-11-16 LAB — SARS-COV-2 RNA RESP QL NAA+PROBE: NEGATIVE

## 2021-11-17 ENCOUNTER — ANESTHESIA (OUTPATIENT)
Dept: SURGERY | Facility: AMBULATORY SURGERY CENTER | Age: 21
End: 2021-11-17
Payer: COMMERCIAL

## 2021-11-17 ENCOUNTER — ANCILLARY PROCEDURE (OUTPATIENT)
Dept: GENERAL RADIOLOGY | Facility: CLINIC | Age: 21
End: 2021-11-17
Attending: INTERNAL MEDICINE
Payer: COMMERCIAL

## 2021-11-17 ENCOUNTER — ANESTHESIA EVENT (OUTPATIENT)
Dept: SURGERY | Facility: AMBULATORY SURGERY CENTER | Age: 21
End: 2021-11-17
Payer: COMMERCIAL

## 2021-11-17 ENCOUNTER — HOSPITAL ENCOUNTER (OUTPATIENT)
Facility: AMBULATORY SURGERY CENTER | Age: 21
End: 2021-11-17
Attending: INTERNAL MEDICINE
Payer: COMMERCIAL

## 2021-11-17 VITALS
HEART RATE: 75 BPM | RESPIRATION RATE: 11 BRPM | OXYGEN SATURATION: 98 % | SYSTOLIC BLOOD PRESSURE: 114 MMHG | DIASTOLIC BLOOD PRESSURE: 72 MMHG

## 2021-11-17 VITALS — HEART RATE: 78 BPM

## 2021-11-17 DIAGNOSIS — J93.11 PRIMARY SPONTANEOUS PNEUMOTHORAX: ICD-10-CM

## 2021-11-17 LAB — UPPER GI ENDOSCOPY: NORMAL

## 2021-11-17 PROCEDURE — 88305 TISSUE EXAM BY PATHOLOGIST: CPT | Mod: 26 | Performed by: PATHOLOGY

## 2021-11-17 PROCEDURE — 71046 X-RAY EXAM CHEST 2 VIEWS: CPT | Performed by: RADIOLOGY

## 2021-11-17 PROCEDURE — 88305 TISSUE EXAM BY PATHOLOGIST: CPT | Mod: TC | Performed by: INTERNAL MEDICINE

## 2021-11-17 PROCEDURE — 43239 EGD BIOPSY SINGLE/MULTIPLE: CPT

## 2021-11-17 RX ORDER — ONDANSETRON 2 MG/ML
4 INJECTION INTRAMUSCULAR; INTRAVENOUS
Status: DISCONTINUED | OUTPATIENT
Start: 2021-11-17 | End: 2021-11-18 | Stop reason: HOSPADM

## 2021-11-17 RX ORDER — SIMETHICONE
LIQUID (ML) MISCELLANEOUS PRN
Status: DISCONTINUED | OUTPATIENT
Start: 2021-11-17 | End: 2021-11-17 | Stop reason: HOSPADM

## 2021-11-17 RX ORDER — PROPOFOL 10 MG/ML
INJECTION, EMULSION INTRAVENOUS CONTINUOUS PRN
Status: DISCONTINUED | OUTPATIENT
Start: 2021-11-17 | End: 2021-11-17

## 2021-11-17 RX ORDER — PROPOFOL 10 MG/ML
INJECTION, EMULSION INTRAVENOUS PRN
Status: DISCONTINUED | OUTPATIENT
Start: 2021-11-17 | End: 2021-11-17

## 2021-11-17 RX ORDER — SODIUM CHLORIDE, SODIUM LACTATE, POTASSIUM CHLORIDE, CALCIUM CHLORIDE 600; 310; 30; 20 MG/100ML; MG/100ML; MG/100ML; MG/100ML
INJECTION, SOLUTION INTRAVENOUS CONTINUOUS PRN
Status: DISCONTINUED | OUTPATIENT
Start: 2021-11-17 | End: 2021-11-17

## 2021-11-17 RX ORDER — LIDOCAINE HYDROCHLORIDE 20 MG/ML
INJECTION, SOLUTION INFILTRATION; PERINEURAL PRN
Status: DISCONTINUED | OUTPATIENT
Start: 2021-11-17 | End: 2021-11-17

## 2021-11-17 RX ORDER — OMEPRAZOLE 40 MG/1
40 CAPSULE, DELAYED RELEASE ORAL 2 TIMES DAILY
COMMUNITY
Start: 2021-11-04

## 2021-11-17 RX ORDER — SODIUM CHLORIDE, SODIUM LACTATE, POTASSIUM CHLORIDE, CALCIUM CHLORIDE 600; 310; 30; 20 MG/100ML; MG/100ML; MG/100ML; MG/100ML
INJECTION, SOLUTION INTRAVENOUS CONTINUOUS
Status: DISCONTINUED | OUTPATIENT
Start: 2021-11-17 | End: 2021-11-18 | Stop reason: HOSPADM

## 2021-11-17 RX ORDER — LIDOCAINE 40 MG/G
CREAM TOPICAL
Status: DISCONTINUED | OUTPATIENT
Start: 2021-11-17 | End: 2021-11-18 | Stop reason: HOSPADM

## 2021-11-17 RX ADMIN — SODIUM CHLORIDE, SODIUM LACTATE, POTASSIUM CHLORIDE, CALCIUM CHLORIDE: 600; 310; 30; 20 INJECTION, SOLUTION INTRAVENOUS at 12:02

## 2021-11-17 RX ADMIN — PROPOFOL 50 MG: 10 INJECTION, EMULSION INTRAVENOUS at 12:27

## 2021-11-17 RX ADMIN — LIDOCAINE HYDROCHLORIDE 50 MG: 20 INJECTION, SOLUTION INFILTRATION; PERINEURAL at 12:25

## 2021-11-17 RX ADMIN — PROPOFOL 50 MG: 10 INJECTION, EMULSION INTRAVENOUS at 12:25

## 2021-11-17 RX ADMIN — PROPOFOL 50 MG: 10 INJECTION, EMULSION INTRAVENOUS at 12:26

## 2021-11-17 RX ADMIN — PROPOFOL 200 MCG/KG/MIN: 10 INJECTION, EMULSION INTRAVENOUS at 12:27

## 2021-11-17 NOTE — ANESTHESIA POSTPROCEDURE EVALUATION
Patient: Trevor Cardenas    Procedure: Procedure(s):  ESOPHAGOGASTRODUODENOSCOPY, WITH BIOPSY       Diagnosis:Gastrointestinal hemorrhage with melena [K92.1]  Diagnosis Additional Information: No value filed.    Anesthesia Type:  MAC    Note:  Disposition: Outpatient   Postop Pain Control: Uneventful            Sign Out: Well controlled pain   PONV: No   Neuro/Psych: Uneventful            Sign Out: Acceptable/Baseline neuro status   Airway/Respiratory: Uneventful            Sign Out: Acceptable/Baseline resp. status   CV/Hemodynamics: Uneventful            Sign Out: Acceptable CV status; No obvious hypovolemia; No obvious fluid overload   Other NRE: NONE   DID A NON-ROUTINE EVENT OCCUR? No           Last vitals:  Vitals Value Taken Time   /72 11/17/21 1300   Temp     Pulse 75 11/17/21 1300   Resp 11 11/17/21 1300   SpO2 98 % 11/17/21 1300       Electronically Signed By: Anup Wilks MD, MD  November 17, 2021  1:01 PM

## 2021-11-17 NOTE — H&P
Trevor Cardenas  [id number]  male  21 year old      Reason for procedure/surgery: Melena    Patient Active Problem List   Diagnosis     Spontaneous pneumothorax       Past Surgical History:    Past Surgical History:   Procedure Laterality Date     THORACIC SURGERY      spontaenous lung collapse       Past Medical History: History reviewed. No pertinent past medical history.    Social History:   Social History     Tobacco Use     Smoking status: Former Smoker     Smokeless tobacco: Never Used   Substance Use Topics     Alcohol use: Yes     Comment: Drinks 3 days a week       Family History: History reviewed. No pertinent family history.    Allergies: No Known Allergies    Active Medications:   Current Outpatient Medications   Medication Sig Dispense Refill     omeprazole (PRILOSEC) 40 MG DR capsule Take 40 mg by mouth 2 times daily       ibuprofen (ADVIL/MOTRIN) 200 MG tablet Take 600 mg by mouth every 6 hours as needed for mild pain         Systemic Review:   CONSTITUTIONAL: NEGATIVE for fever, chills, change in weight  ENT/MOUTH: NEGATIVE for ear, mouth and throat problems  RESP: NEGATIVE for significant cough or SOB  CV: NEGATIVE for chest pain, palpitations or peripheral edema    Physical Examination:   Vital Signs: /69   Pulse 66   Resp 16   SpO2 99%   GENERAL: healthy, alert and no distress  NECK: no adenopathy, no asymmetry, masses, or scars  RESP: lungs clear to auscultation - no rales, rhonchi or wheezes  CV: regular rate and rhythm, normal S1 S2, no S3 or S4, no murmur, click or rub, no peripheral edema and peripheral pulses strong  ABDOMEN: soft, nontender, no hepatosplenomegaly, no masses and bowel sounds normal  MS: no gross musculoskeletal defects noted, no edema    Plan: Appropriate to proceed as scheduled.      Alex Sarmiento MD  11/17/2021    PCP:  No Ref-Primary, Physician

## 2021-11-17 NOTE — DISCHARGE INSTRUCTIONS
Discharge Instructions after  Upper Endoscopy (EGD)    Activity and Diet  You were given medicine for pain. You may be dizzy or sleepy.  For 24 hours:    Do not drive or use heavy equipment.    Do not make important decisions.    Do not drink any alcohol.  ___ You may return to your regular diet.    Discomfort  You may have a sore throat for 2 to 3 days. It may help to:    Avoid hot liquids for 24 hours.    Use sore throat lozenges.    Gargle as needed with salt water up to 4 times a day. Mix 1 cup of warm water  with 1 teaspoon of salt. Do not swallow.  ___ Your esophagus was dilated (opened) or banded during the exam:    Drink only cool liquids for the rest of the day. Eat a soft diet for the next few days.    You may have a sore chest for 2 to 3 days.    You may take Tylenol (acetaminophen) for pain unless your doctor has told you not to.    Do not take aspirin or ibuprofen (Advil, Motrin) or other NSAIDS  (anti-inflammatory drugs) for ___ days.    Follow-up  ___ We took small tissue samples for study. If you do not have a follow-up visit scheduled,  call your provider s office in 2 weeks for the results.    Other instructions________________________________________________________    When to call us:  Problems are rare. Call right away if you have:    Unusual throat pain or trouble swallowing    Unusual pain in belly or chest that is not relieved by belching or passing air    Black stools (tar-like looking bowel movement)    Temperature above 100.6  F. (37.5  C).    If you vomit blood or have severe pain, go to an emergency room.    If you have questions, call:  Monday to Friday, 8 a.m. to 4:30 p.m.: Central Scheduling Department:[phone]    After hours: Hospital: [phone] (Ask for the GI fellow on call)

## 2021-11-17 NOTE — ANESTHESIA CARE TRANSFER NOTE
Patient: Trevor Cardenas    Procedure: Procedure(s):  ESOPHAGOGASTRODUODENOSCOPY, WITH BIOPSY       Diagnosis: Gastrointestinal hemorrhage with melena [K92.1]  Diagnosis Additional Information: No value filed.    Anesthesia Type:   MAC     Note:    Oropharynx: oropharynx clear of all foreign objects  Level of Consciousness: awake  Oxygen Supplementation: room air    Independent Airway: airway patency satisfactory and stable  Dentition: dentition unchanged  Vital Signs Stable: post-procedure vital signs reviewed and stable  Report to RN Given: handoff report given  Patient transferred to: Phase II    Handoff Report: Identifed the Patient, Identified the Reponsible Provider, Reviewed the pertinent medical history, Discussed the surgical course, Reviewed Intra-OP anesthesia mangement and issues during anesthesia, Set expectations for post-procedure period and Allowed opportunity for questions and acknowledgement of understanding      Vitals:  Vitals Value Taken Time   BP     Temp     Pulse     Resp     SpO2         Electronically Signed By: TERESA Townsend CRNA  November 17, 2021  12:41 PM

## 2021-11-17 NOTE — ANESTHESIA PREPROCEDURE EVALUATION
Anesthesia Pre-Procedure Evaluation    Patient: Trevor Cardenas   MRN: [id number] : 2000        Preoperative Diagnosis: Gastrointestinal hemorrhage with melena [K92.1]    Procedure : Procedure(s):  ESOPHAGOGASTRODUODENOSCOPY (EGD)          History reviewed. No pertinent past medical history.   Past Surgical History:   Procedure Laterality Date     THORACIC SURGERY      spontaenous lung collapse      No Known Allergies   Social History     Tobacco Use     Smoking status: Former Smoker     Smokeless tobacco: Never Used   Substance Use Topics     Alcohol use: Yes     Comment: Drinks 3 days a week      Wt Readings from Last 1 Encounters:   10/28/21 65.3 kg (144 lb)        Anesthesia Evaluation   Pt has had prior anesthetic. Type: General.        ROS/MED HX  ENT/Pulmonary:  - neg pulmonary ROS     Neurologic:  - neg neurologic ROS     Cardiovascular:  - neg cardiovascular ROS     METS/Exercise Tolerance:     Hematologic:  - neg hematologic  ROS     Musculoskeletal:  - neg musculoskeletal ROS     GI/Hepatic:  - neg GI/hepatic ROS     Renal/Genitourinary:  - neg Renal ROS     Endo:  - neg endo ROS     Psychiatric/Substance Use:  - neg psychiatric ROS     Infectious Disease:  - neg infectious disease ROS     Malignancy:  - neg malignancy ROS     Other:  - neg other ROS          Physical Exam    Airway  airway exam normal           Respiratory Devices and Support         Dental  no notable dental history         Cardiovascular   cardiovascular exam normal          Pulmonary   pulmonary exam normal                OUTSIDE LABS:  CBC:   Lab Results   Component Value Date    WBC 5.2 10/12/2021    HGB 14.6 10/12/2021    HCT 42.2 10/12/2021     10/12/2021     BMP:   Lab Results   Component Value Date     10/12/2021    POTASSIUM 3.9 10/12/2021    CHLORIDE 108 10/12/2021    CO2 29 10/12/2021    BUN 13 10/12/2021    CR 1.22 10/12/2021    GLC 92 10/12/2021     COAGS: No results found for: PTT, INR,  FIBR  POC: No results found for: BGM, HCG, HCGS  HEPATIC: No results found for: ALBUMIN, PROTTOTAL, ALT, AST, GGT, ALKPHOS, BILITOTAL, BILIDIRECT, VISH  OTHER:   Lab Results   Component Value Date    ABDIEL 8.8 10/12/2021    CRP <2.9 10/12/2021    SED 7 10/12/2021       Anesthesia Plan    ASA Status:  1   NPO Status:  NPO Appropriate    Anesthesia Type: MAC.     - Reason for MAC: straight local not clinically adequate   Induction: Intravenous.   Maintenance: TIVA.        Consents    Anesthesia Plan(s) and associated risks, benefits, and realistic alternatives discussed. Questions answered and patient/representative(s) expressed understanding.     - Discussed: Risks, Benefits and Alternatives for BOTH SEDATION and the PROCEDURE were discussed     - Discussed with:  Patient         Postoperative Care    Pain management: Oral pain medications.   PONV prophylaxis: Ondansetron (or other 5HT-3), Dexamethasone or Solumedrol     Comments:                Anup Wilks MD, MD

## 2021-11-18 LAB
PATH REPORT.COMMENTS IMP SPEC: NORMAL
PATH REPORT.COMMENTS IMP SPEC: NORMAL
PATH REPORT.FINAL DX SPEC: NORMAL
PATH REPORT.GROSS SPEC: NORMAL
PATH REPORT.MICROSCOPIC SPEC OTHER STN: NORMAL
PATH REPORT.RELEVANT HX SPEC: NORMAL
PHOTO IMAGE: NORMAL

## 2021-12-08 NOTE — TELEPHONE ENCOUNTER
Refilled on 11/10 by Dr Sarmiento, no changes in plan of care at this time and no changes in appoitment

## 2022-01-19 ENCOUNTER — TELEPHONE (OUTPATIENT)
Dept: GASTROENTEROLOGY | Facility: CLINIC | Age: 22
End: 2022-01-19
Payer: COMMERCIAL

## 2022-01-19 NOTE — TELEPHONE ENCOUNTER
CHRISTIANA, sent mychart and letter that 2/15 visit with Rabia Edwards needs rescheduling, provider no longer available that date. R/S to next available with Rabia or another provider for dx.

## 2022-02-15 ENCOUNTER — PRE VISIT (OUTPATIENT)
Dept: GASTROENTEROLOGY | Facility: CLINIC | Age: 22
End: 2022-02-15

## 2022-04-07 NOTE — TELEPHONE ENCOUNTER
----- Message from Tripp Mendez sent at 4/7/2022  9:15 AM EDT -----  Subject: Message to Provider    QUESTIONS  Information for Provider? Patient recently discharged from [facility] for left leg ambulation and needs to speak with Krystyna Mclaughlin about his situation, not able to get around right now and not   getting much assistance. Patient is also having trouble with his   insurance. Also needs pain medication, he is currently out. 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   ---------------------------------------------------------------------------  --------------  [address]  What is the best way for the office to contact you? OK to leave message on   voicemail  Preferred Call Back Phone Number? [phone]  ---------------------------------------------------------------------------  --------------  SCRIPT ANSWERS  Relationship to Patient?  Self Pre assessment questions completed for upcoming EGD procedure scheduled on 11/17/21    COVID test scheduled 11/15/21    Reviewed procedural arrival time 1100 and facility location [facility].    Designated  policy reviewed. Instructed to have someone stay 6 hours post procedure.     Reviewed EGD prep instructions with patient. NPO six hours prior to procedure    Anticoagulation/blood thinners? no    Electronic implanted devices? no    Patient verbalized understanding and had no questions or concerns at this time.    Yuly Vegas RN

## 2022-10-03 ENCOUNTER — HEALTH MAINTENANCE LETTER (OUTPATIENT)
Age: 22
End: 2022-10-03

## 2023-02-11 ENCOUNTER — HEALTH MAINTENANCE LETTER (OUTPATIENT)
Age: 23
End: 2023-02-11

## 2024-03-09 ENCOUNTER — HEALTH MAINTENANCE LETTER (OUTPATIENT)
Age: 24
End: 2024-03-09

## 2025-03-16 ENCOUNTER — HEALTH MAINTENANCE LETTER (OUTPATIENT)
Age: 25
End: 2025-03-16

## 2025-04-08 ENCOUNTER — PATIENT OUTREACH (OUTPATIENT)
Dept: ONCOLOGY | Facility: CLINIC | Age: 25
End: 2025-04-08
Payer: COMMERCIAL

## 2025-04-08 ENCOUNTER — PRE VISIT (OUTPATIENT)
Dept: OTHER | Age: 25
End: 2025-04-08

## 2025-04-08 ENCOUNTER — TRANSCRIBE ORDERS (OUTPATIENT)
Dept: ONCOLOGY | Facility: CLINIC | Age: 25
End: 2025-04-08
Payer: COMMERCIAL

## 2025-04-08 DIAGNOSIS — J98.19 PULMONARY COLLAPSE: Primary | ICD-10-CM

## 2025-04-08 DIAGNOSIS — J93.9 PNEUMOTHORAX: Primary | ICD-10-CM

## 2025-04-08 NOTE — PROGRESS NOTES
New Patient Oncology Nurse Navigator Note     Referring provider: Self    Referring Clinic/Organization: Self Referred  Referred to: Thoracic Surgery  Requested provider (if applicable): First available - did not specify   Referral Received: 04/08/25       Evaluation for :   Diagnosis   J98.19 (ICD-10-CM) - Pulmonary collapse      Note:  PT CALLED  DX: Collasped Lung  Pt was in ED (4/2-4/3) at [facility] and got scheduled with a thoracic surgeon and xray with [facility] but found that they were out of net work so pt would like to [facility] with [facility].  TRANSFERRED PT TO [facility].     Clinical History (per Nurse review of records provided):      04/03/2025 XR Chest (Care Everywhere) showed:   Impression    Unchanged small left apical pneumothorax    Clinical Assessment / Barriers to Care (Per Nurse):  Tobacco History    Smoking Status  Former     Records Location: Care Everywhere   Records Needed: Outside XR images  Additional testing needed prior to consult: CT Chest w/ contrast    LUPILLO BrownN, RN, OCN  [facility] Oncology Nurse Navigator  [phone] / [phone]

## 2025-04-08 NOTE — TELEPHONE ENCOUNTER
Action April 8, 2025 11:37 AM BWM   Action Taken Warm transfer from NPS.     Spoke with patient. Patient reports going to [facility] for a partially collapsed lung on 4/2/2025. Reports having chest imaging done during this visit.     Reports having being intubated, but no other procedures/biopsies.     Reports visiting [facility] and being admitted to [facility] a couple years ago due to the same concern. Reports also having chest imaging during this visit.    Denies any visits with an oncologist. Denies any previous radiation or chemotherapy.     Denies any genetic testing.      RECORDS STATUS - ALL OTHER DIAGNOSIS      RECORDS RECEIVED FROM: Hellen   NOTES STATUS DETAILS   OFFICE NOTE from referring provider SELF REFERRAL    OFFICE NOTE from medical oncologist     DISCHARGE SUMMARY from hospital -Marek  [facility] 4/2/2025 - [facility]    10/12/2021 - [facility]   DISCHARGE REPORT from the ER     OPERATIVE REPORT Epic 11/17/2021 - EGD   MEDICATION LIST [facility]    LABS     PATHOLOGY REPORTS     ANYTHING RELATED TO DIAGNOSIS -[facility] 4/2/2025   PATHOLOGY [facility] TRACKING   TRACKING #:   GENONOMIC TESTING     TYPE:     IMAGING (NEED IMAGES & REPORT)     CT SCANS     MRI     XRAYS PACS PACS:  Xray Chest: 11/17/2021, 10/13/2021, 10/12/2021    [facility]:   Xray Chest: 4/3/2025, 4/2/2025   ULTRASOUND     PET     IMAGE DISC FEDEX TRACKING   TRACKING #:

## 2025-04-18 ENCOUNTER — ANCILLARY PROCEDURE (OUTPATIENT)
Dept: CT IMAGING | Facility: CLINIC | Age: 25
End: 2025-04-18
Attending: CLINICAL NURSE SPECIALIST
Payer: COMMERCIAL

## 2025-04-18 DIAGNOSIS — J93.9 PNEUMOTHORAX: ICD-10-CM

## 2025-04-18 PROCEDURE — 71260 CT THORAX DX C+: CPT | Mod: GC | Performed by: RADIOLOGY

## 2025-04-18 RX ORDER — IOPAMIDOL 755 MG/ML
70 INJECTION, SOLUTION INTRAVASCULAR ONCE
Status: COMPLETED | OUTPATIENT
Start: 2025-04-18 | End: 2025-04-18

## 2025-04-18 RX ADMIN — IOPAMIDOL 70 ML: 755 INJECTION, SOLUTION INTRAVASCULAR at 16:48

## 2025-04-18 NOTE — DISCHARGE INSTRUCTIONS

## 2025-04-21 ENCOUNTER — PREP FOR PROCEDURE (OUTPATIENT)
Dept: SURGERY | Facility: CLINIC | Age: 25
End: 2025-04-21

## 2025-04-21 ENCOUNTER — ONCOLOGY VISIT (OUTPATIENT)
Dept: SURGERY | Facility: CLINIC | Age: 25
End: 2025-04-21
Attending: STUDENT IN AN ORGANIZED HEALTH CARE EDUCATION/TRAINING PROGRAM
Payer: COMMERCIAL

## 2025-04-21 ENCOUNTER — PATIENT OUTREACH (OUTPATIENT)
Dept: SURGERY | Facility: CLINIC | Age: 25
End: 2025-04-21

## 2025-04-21 VITALS
HEIGHT: 73 IN | WEIGHT: 145.3 LBS | TEMPERATURE: 98 F | RESPIRATION RATE: 16 BRPM | SYSTOLIC BLOOD PRESSURE: 145 MMHG | DIASTOLIC BLOOD PRESSURE: 80 MMHG | HEART RATE: 71 BPM | BODY MASS INDEX: 19.26 KG/M2 | OXYGEN SATURATION: 98 %

## 2025-04-21 DIAGNOSIS — J93.11 PRIMARY SPONTANEOUS PNEUMOTHORAX: Primary | ICD-10-CM

## 2025-04-21 PROCEDURE — 99213 OFFICE O/P EST LOW 20 MIN: CPT | Performed by: STUDENT IN AN ORGANIZED HEALTH CARE EDUCATION/TRAINING PROGRAM

## 2025-04-21 PROCEDURE — 99204 OFFICE O/P NEW MOD 45 MIN: CPT | Performed by: STUDENT IN AN ORGANIZED HEALTH CARE EDUCATION/TRAINING PROGRAM

## 2025-04-21 RX ORDER — HEPARIN SODIUM 10000 [USP'U]/ML
5000 INJECTION, SOLUTION INTRAVENOUS; SUBCUTANEOUS
OUTPATIENT
Start: 2025-04-21

## 2025-04-21 ASSESSMENT — PAIN SCALES - GENERAL: PAINLEVEL_OUTOF10: NO PAIN (0)

## 2025-04-21 NOTE — LETTER
4/21/2025      Trevor Cardenas  [address]  [place] [address]      Dear Colleague,    Thank you for referring your patient, Trevor Cardenas, to the [facility]. Please see a copy of my visit note below.    THORACIC SURGERY - NEW PATIENT OFFICE VISIT          I saw Trevor Cardenas  in consultation for the evaluation and treatment of a recurrent left PTX .     HPI  Trevor Cardenas is a 24 year old male wita history of left PTX- spontaneous in 2021 and then more recently earlier this month. He was treated with a chest tube.  He is otherwise healthy    Previsit Tests   CT chest: biateral apical blebs  PMH  Reviewed, as below    No past medical history on file.     PSH  Reviewed, as below    Past Surgical History:   Procedure Laterality Date     ESOPHAGOSCOPY, GASTROSCOPY, DUODENOSCOPY (EGD), COMBINED N/A 11/17/2021    Procedure: ESOPHAGOGASTRODUODENOSCOPY, WITH BIOPSY;  Surgeon: Alex Sarmiento MD;  Location: [facility] [place]     THORACIC SURGERY      spontaenous lung collapse        ETOH occasional   TOB was smoking marijuana until the episode     Physical examination  BMI 19   =    From a personal perspective, he is a research assistant at the [employer]       Code Status: Full Code    Health Care Proxy:    IMPRESSION No diagnosis found.   This person is a 24 year old male with recurrent left PTX     PLAN  I spent 45 min on the date of the encounter in chart review, patient visit, review of tests, documentation and/or discussion with other providers about the issues documented above. I reviewed the plan as follows:  We discussed a blebectomy and mech pleurodesis. He is willing and eager to proceed. We also discussed monitoring for similar symptoms on the right     Procedure planned: Procedure planned: Left VATS blebectomy, mechanical pleurodesis , with possible  thoracotomy.  I reviewed the indications, risks, and benefits of the procedure  with Mr . Trevor Cardenas. We discussed the intraoperative risks of bleeding and injury to vital organs, potential postoperative complications including, but not limited to, major respiratory events, arrhythmia, bleeding, infection, reoperation, and death. I explained the anticipated hospital course (+/- 2-4 days) and postoperative recovery including pain control, chest drain management, and variable degrees of dyspnea (or need for supplemental oxygen) and fatigue that tend to get better with time.      Necessary Preop Tests & Appointments: Preoperative assessment clinic    Regional Anesthesia Plan: Intraoperative intercostal nerve block    Anticoagulation Plan: Prophylactic Heparin         I appreciate the opportunity to participate in the care of your patient and will keep you updated.      Sincerely,    Ale Eddy MD       Again, thank you for allowing me to participate in the care of your patient.        Sincerely,        Ale Eddy MD    Electronically signed

## 2025-04-21 NOTE — PROGRESS NOTES
Met with pt in clinic following visit with Dr. Eddy. Introduced self and role of RNCC.     Handouts given: Thoracic surgery direct contact information     Discussed plan of care including: to be scheduled for blebectomy/pleurodesis. Pt states he lives with his girlfriend who will be home with him after surgery. She does not drive, he plans to ask his parents to drive him home at hospital discharge. PAC only.     RNCC will outreach again: as needed. Pt aware we will start looking for dates for surgery and that scheduling will be in touch.     Pt is aware of need for PAC visit within 30 days prior to surgery and to avoid alcohol for 1 week prior to surgery.     Pt in agreement and had no further questions or concerns.    Arpita Escobar RN BSN  Thoracic Surgery RN Care Coordinator  [phone]

## 2025-04-21 NOTE — PROGRESS NOTES
THORACIC SURGERY - NEW PATIENT OFFICE VISIT          I saw Trevor Cardenas  in consultation for the evaluation and treatment of a recurrent left PTX .     HPI  Trevor Cardenas is a 24 year old male wita history of left PTX- spontaneous in 2021 and then more recently earlier this month. He was treated with a chest tube.  He is otherwise healthy    Previsit Tests   CT chest: biateral apical blebs  PMH  Reviewed, as below    No past medical history on file.     PSH  Reviewed, as below    Past Surgical History:   Procedure Laterality Date    ESOPHAGOSCOPY, GASTROSCOPY, DUODENOSCOPY (EGD), COMBINED N/A 11/17/2021    Procedure: ESOPHAGOGASTRODUODENOSCOPY, WITH BIOPSY;  Surgeon: Alex Sarmiento MD;  [place] [facility] [place]    THORACIC SURGERY      spontaenous lung collapse        ETOH occasional   TOB was smoking marijuana until the episode     Physical examination  BMI 19   =    From a personal perspective, he is a research assistant at the  of        Code Status: Full Code    Health Care Proxy:    IMPRESSION No diagnosis found.   This person is a 24 year old male with recurrent left PTX     PLAN  I spent 45 min on the date of the encounter in chart review, patient visit, review of tests, documentation and/or discussion with other providers about the issues documented above. I reviewed the plan as follows:  We discussed a blebectomy and mech pleurodesis. He is willing and eager to proceed. We also discussed monitoring for similar symptoms on the right     Procedure planned: Procedure planned: Left VATS blebectomy, mechanical pleurodesis , with possible  thoracotomy.  I reviewed the indications, risks, and benefits of the procedure with Mr Capri Cardenas. We discussed the intraoperative risks of bleeding and injury to vital organs, potential postoperative complications including, but not limited to, major respiratory events, arrhythmia, bleeding, infection, reoperation, and death. I  explained the anticipated hospital course (+/- 2-4 days) and postoperative recovery including pain control, chest drain management, and variable degrees of dyspnea (or need for supplemental oxygen) and fatigue that tend to get better with time.      Necessary Preop Tests & Appointments: Preoperative assessment clinic    Regional Anesthesia Plan: Intraoperative intercostal nerve block    Anticoagulation Plan: Prophylactic Heparin         I appreciate the opportunity to participate in the care of your patient and will keep you updated.      Sincerely,    Ale Eddy MD

## 2025-04-21 NOTE — NURSING NOTE
"Oncology Rooming Note    April 21, 2025 9:41 AM   Trevor Cardenas is a 24 year old male who presents for:    Chief Complaint   Patient presents with    Oncology Clinic Visit     Collapsed Lung     Initial Vitals: BP (!) 145/80 (BP Location: Right arm, Patient Position: Sitting, Cuff Size: Adult Regular)   Pulse 71   Temp 98  F (36.7  C) (Oral)   Resp 16   Ht 1.854 m (6' 1\")   Wt 65.9 kg (145 lb 4.8 oz)   SpO2 98%   BMI 19.17 kg/m   Estimated body mass index is 19.17 kg/m  as calculated from the following:    Height as of this encounter: 1.854 m (6' 1\").    Weight as of this encounter: 65.9 kg (145 lb 4.8 oz). Body surface area is 1.84 meters squared.  No Pain (0) Comment: Data Unavailable   No LMP for male patient.  Allergies reviewed: Yes  Medications reviewed: Yes    Medications: Medication refills not needed today.  Pharmacy name entered into [employer]: [id number] - [facility] [address]    Frailty Screening:   Is the patient here for a new oncology consult visit in cancer care? 2. No    PHQ9:  Did this patient require a PHQ9?: Yes   If the patient required a PHQ9 assessment, did the results require a follow up with the Provider/Nurse?: No      Clinical concerns: Patient is new.       Zoila Grimes LPN  4/21/2025              "

## 2025-04-23 ENCOUNTER — TELEPHONE (OUTPATIENT)
Dept: SURGERY | Facility: CLINIC | Age: 25
End: 2025-04-23
Payer: COMMERCIAL

## 2025-04-23 NOTE — TELEPHONE ENCOUNTER
Spoke with patient to schedule procedure with Dr. Eddy   Procedure was scheduled on 6/9 at [facility] OR  Patient will have H&P with PAC    Informed pt of surgery details:  Date:    Monday, June 09, 2024  Arrival Time:  5:30 am    Pt is aware surgery start time may change, and someone will reach out with the new arrival time, if so.    Patient is aware a COVID-19 test is needed before their procedure ONLY IF symptomatic.   (Patient is aware Thoracic is no longer requiring COVID-19 test)       Patient is aware a / is needed day of surgery.   Surgery Letter was sent via [employer],     Patient has my direct contact information for any further questions.

## 2025-04-28 NOTE — CONFIDENTIAL NOTE
FUTURE VISIT INFORMATION      SURGERY INFORMATION:  Date: 6.9.25   Location[place] [facility]  Surgeon:  Ale Eddy MD   Anesthesia Type:  General   Procedure: PLEURODESIS, THORACOSCOPIC, blebectomy     RECORDS REQUESTED FROM:       Primary Care Provider:  Most recent EKG+ Tracing:  -10.12.21   -EKG lead- 4.2.25

## 2025-05-22 DIAGNOSIS — J93.11 PRIMARY SPONTANEOUS PNEUMOTHORAX: Primary | ICD-10-CM

## 2025-05-30 ENCOUNTER — RESULTS FOLLOW-UP (OUTPATIENT)
Dept: SURGERY | Facility: CLINIC | Age: 25
End: 2025-05-30

## 2025-05-30 ENCOUNTER — PRE VISIT (OUTPATIENT)
Dept: SURGERY | Facility: CLINIC | Age: 25
End: 2025-05-30

## 2025-05-30 ENCOUNTER — APPOINTMENT (OUTPATIENT)
Dept: LAB | Facility: CLINIC | Age: 25
End: 2025-05-30
Payer: COMMERCIAL

## 2025-05-30 ENCOUNTER — ANESTHESIA EVENT (OUTPATIENT)
Dept: SURGERY | Facility: CLINIC | Age: 25
End: 2025-05-30
Payer: COMMERCIAL

## 2025-06-05 ENCOUNTER — PATIENT OUTREACH (OUTPATIENT)
Dept: SURGERY | Facility: CLINIC | Age: 25
End: 2025-06-05
Payer: COMMERCIAL

## 2025-06-05 NOTE — PROGRESS NOTES
Call placed to pt to review preop education prior to blebectomy on 6/9 with Dr. Eddy. There was no answer and no option to leave voicemail. Pt is active on [employer] and a message was sent.    Arpita Escobar RN BSN  Thoracic Surgery RN Care Coordinator  Phone: [phone]

## 2025-06-09 ENCOUNTER — ANESTHESIA (OUTPATIENT)
Dept: SURGERY | Facility: CLINIC | Age: 25
End: 2025-06-09
Payer: COMMERCIAL

## 2025-06-09 ENCOUNTER — APPOINTMENT (OUTPATIENT)
Dept: GENERAL RADIOLOGY | Facility: CLINIC | Age: 25
End: 2025-06-09
Payer: COMMERCIAL

## 2025-06-09 ENCOUNTER — HOSPITAL ENCOUNTER (INPATIENT)
Facility: CLINIC | Age: 25
End: 2025-06-09
Attending: STUDENT IN AN ORGANIZED HEALTH CARE EDUCATION/TRAINING PROGRAM | Admitting: STUDENT IN AN ORGANIZED HEALTH CARE EDUCATION/TRAINING PROGRAM
Payer: COMMERCIAL

## 2025-06-09 DIAGNOSIS — J93.83 SPONTANEOUS PNEUMOTHORAX: ICD-10-CM

## 2025-06-09 DIAGNOSIS — J93.11 PRIMARY SPONTANEOUS PNEUMOTHORAX: Primary | ICD-10-CM

## 2025-06-09 LAB
ABO + RH BLD: NORMAL
BLD GP AB SCN SERPL QL: NEGATIVE
CREAT SERPL-MCNC: 1.02 MG/DL (ref 0.67–1.17)
EGFRCR SERPLBLD CKD-EPI 2021: >90 ML/MIN/1.73M2
GLUCOSE BLDC GLUCOMTR-MCNC: 101 MG/DL (ref 70–99)
SPECIMEN EXP DATE BLD: NORMAL

## 2025-06-09 PROCEDURE — 999N000141 HC STATISTIC PRE-PROCEDURE NURSING ASSESSMENT: Performed by: STUDENT IN AN ORGANIZED HEALTH CARE EDUCATION/TRAINING PROGRAM

## 2025-06-09 PROCEDURE — 272N000001 HC OR GENERAL SUPPLY STERILE: Performed by: STUDENT IN AN ORGANIZED HEALTH CARE EDUCATION/TRAINING PROGRAM

## 2025-06-09 PROCEDURE — 36415 COLL VENOUS BLD VENIPUNCTURE: CPT | Performed by: STUDENT IN AN ORGANIZED HEALTH CARE EDUCATION/TRAINING PROGRAM

## 2025-06-09 PROCEDURE — 710N000010 HC RECOVERY PHASE 1, LEVEL 2, PER MIN: Performed by: STUDENT IN AN ORGANIZED HEALTH CARE EDUCATION/TRAINING PROGRAM

## 2025-06-09 PROCEDURE — 36415 COLL VENOUS BLD VENIPUNCTURE: CPT

## 2025-06-09 PROCEDURE — 0BBG4ZZ EXCISION OF LEFT UPPER LUNG LOBE, PERCUTANEOUS ENDOSCOPIC APPROACH: ICD-10-PCS | Performed by: STUDENT IN AN ORGANIZED HEALTH CARE EDUCATION/TRAINING PROGRAM

## 2025-06-09 PROCEDURE — 86900 BLOOD TYPING SEROLOGIC ABO: CPT | Performed by: STUDENT IN AN ORGANIZED HEALTH CARE EDUCATION/TRAINING PROGRAM

## 2025-06-09 PROCEDURE — 250N000011 HC RX IP 250 OP 636

## 2025-06-09 PROCEDURE — 250N000011 HC RX IP 250 OP 636: Mod: JZ

## 2025-06-09 PROCEDURE — 250N000025 HC SEVOFLURANE, PER MIN: Performed by: STUDENT IN AN ORGANIZED HEALTH CARE EDUCATION/TRAINING PROGRAM

## 2025-06-09 PROCEDURE — 32666 THORACOSCOPY W/WEDGE RESECT: CPT | Mod: LT | Performed by: STUDENT IN AN ORGANIZED HEALTH CARE EDUCATION/TRAINING PROGRAM

## 2025-06-09 PROCEDURE — 120N000002 HC R&B MED SURG/OB UMMC

## 2025-06-09 PROCEDURE — 250N000009 HC RX 250: Performed by: STUDENT IN AN ORGANIZED HEALTH CARE EDUCATION/TRAINING PROGRAM

## 2025-06-09 PROCEDURE — 258N000003 HC RX IP 258 OP 636: Performed by: NURSE ANESTHETIST, CERTIFIED REGISTERED

## 2025-06-09 PROCEDURE — 82565 ASSAY OF CREATININE: CPT

## 2025-06-09 PROCEDURE — 71045 X-RAY EXAM CHEST 1 VIEW: CPT

## 2025-06-09 PROCEDURE — 360N000077 HC SURGERY LEVEL 4, PER MIN: Performed by: STUDENT IN AN ORGANIZED HEALTH CARE EDUCATION/TRAINING PROGRAM

## 2025-06-09 PROCEDURE — 88307 TISSUE EXAM BY PATHOLOGIST: CPT | Mod: 26 | Performed by: STUDENT IN AN ORGANIZED HEALTH CARE EDUCATION/TRAINING PROGRAM

## 2025-06-09 PROCEDURE — 370N000017 HC ANESTHESIA TECHNICAL FEE, PER MIN: Performed by: STUDENT IN AN ORGANIZED HEALTH CARE EDUCATION/TRAINING PROGRAM

## 2025-06-09 PROCEDURE — 88307 TISSUE EXAM BY PATHOLOGIST: CPT | Mod: TC | Performed by: STUDENT IN AN ORGANIZED HEALTH CARE EDUCATION/TRAINING PROGRAM

## 2025-06-09 PROCEDURE — 250N000009 HC RX 250: Performed by: NURSE ANESTHETIST, CERTIFIED REGISTERED

## 2025-06-09 PROCEDURE — 71045 X-RAY EXAM CHEST 1 VIEW: CPT | Mod: 26 | Performed by: RADIOLOGY

## 2025-06-09 PROCEDURE — 32650 THORACOSCOPY W/PLEURODESIS: CPT | Mod: LT | Performed by: STUDENT IN AN ORGANIZED HEALTH CARE EDUCATION/TRAINING PROGRAM

## 2025-06-09 PROCEDURE — 250N000011 HC RX IP 250 OP 636: Performed by: STUDENT IN AN ORGANIZED HEALTH CARE EDUCATION/TRAINING PROGRAM

## 2025-06-09 PROCEDURE — 250N000013 HC RX MED GY IP 250 OP 250 PS 637

## 2025-06-09 PROCEDURE — 250N000011 HC RX IP 250 OP 636: Mod: JZ | Performed by: NURSE ANESTHETIST, CERTIFIED REGISTERED

## 2025-06-09 RX ORDER — OXYCODONE HYDROCHLORIDE 10 MG/1
10 TABLET ORAL
Refills: 0 | Status: CANCELLED | OUTPATIENT
Start: 2025-06-09

## 2025-06-09 RX ORDER — BISACODYL 10 MG
10 SUPPOSITORY, RECTAL RECTAL DAILY PRN
Status: DISCONTINUED | OUTPATIENT
Start: 2025-06-12 | End: 2025-06-15 | Stop reason: HOSPADM

## 2025-06-09 RX ORDER — FENTANYL CITRATE 50 UG/ML
25 INJECTION, SOLUTION INTRAMUSCULAR; INTRAVENOUS EVERY 5 MIN PRN
Status: DISCONTINUED | OUTPATIENT
Start: 2025-06-09 | End: 2025-06-09 | Stop reason: ALTCHOICE

## 2025-06-09 RX ORDER — LIDOCAINE HYDROCHLORIDE 20 MG/ML
INJECTION, SOLUTION INFILTRATION; PERINEURAL PRN
Status: DISCONTINUED | OUTPATIENT
Start: 2025-06-09 | End: 2025-06-09

## 2025-06-09 RX ORDER — ONDANSETRON 4 MG/1
4 TABLET, ORALLY DISINTEGRATING ORAL EVERY 30 MIN PRN
Status: DISCONTINUED | OUTPATIENT
Start: 2025-06-09 | End: 2025-06-09 | Stop reason: ALTCHOICE

## 2025-06-09 RX ORDER — DEXAMETHASONE SODIUM PHOSPHATE 4 MG/ML
4 INJECTION, SOLUTION INTRA-ARTICULAR; INTRALESIONAL; INTRAMUSCULAR; INTRAVENOUS; SOFT TISSUE
Status: CANCELLED | OUTPATIENT
Start: 2025-06-09

## 2025-06-09 RX ORDER — PROPOFOL 10 MG/ML
INJECTION, EMULSION INTRAVENOUS PRN
Status: DISCONTINUED | OUTPATIENT
Start: 2025-06-09 | End: 2025-06-09

## 2025-06-09 RX ORDER — CEFAZOLIN SODIUM/WATER 2 G/20 ML
2 SYRINGE (ML) INTRAVENOUS
Status: COMPLETED | OUTPATIENT
Start: 2025-06-09 | End: 2025-06-09

## 2025-06-09 RX ORDER — HYDROMORPHONE HCL IN WATER/PF 6 MG/30 ML
0.4 PATIENT CONTROLLED ANALGESIA SYRINGE INTRAVENOUS EVERY 5 MIN PRN
Status: DISCONTINUED | OUTPATIENT
Start: 2025-06-09 | End: 2025-06-09 | Stop reason: ALTCHOICE

## 2025-06-09 RX ORDER — OXYCODONE HYDROCHLORIDE 5 MG/1
5 TABLET ORAL EVERY 4 HOURS PRN
Status: DISCONTINUED | OUTPATIENT
Start: 2025-06-09 | End: 2025-06-15 | Stop reason: HOSPADM

## 2025-06-09 RX ORDER — HYDROMORPHONE HCL IN WATER/PF 6 MG/30 ML
0.2 PATIENT CONTROLLED ANALGESIA SYRINGE INTRAVENOUS
Status: DISCONTINUED | OUTPATIENT
Start: 2025-06-09 | End: 2025-06-15 | Stop reason: HOSPADM

## 2025-06-09 RX ORDER — METHOCARBAMOL 500 MG/1
500 TABLET, FILM COATED ORAL EVERY 6 HOURS PRN
Status: DISCONTINUED | OUTPATIENT
Start: 2025-06-09 | End: 2025-06-10

## 2025-06-09 RX ORDER — POLYETHYLENE GLYCOL 3350 17 G/17G
17 POWDER, FOR SOLUTION ORAL DAILY
Status: DISCONTINUED | OUTPATIENT
Start: 2025-06-10 | End: 2025-06-15 | Stop reason: HOSPADM

## 2025-06-09 RX ORDER — NALOXONE HYDROCHLORIDE 0.4 MG/ML
0.4 INJECTION, SOLUTION INTRAMUSCULAR; INTRAVENOUS; SUBCUTANEOUS
Status: DISCONTINUED | OUTPATIENT
Start: 2025-06-09 | End: 2025-06-15 | Stop reason: HOSPADM

## 2025-06-09 RX ORDER — NALOXONE HYDROCHLORIDE 0.4 MG/ML
0.2 INJECTION, SOLUTION INTRAMUSCULAR; INTRAVENOUS; SUBCUTANEOUS
Status: DISCONTINUED | OUTPATIENT
Start: 2025-06-09 | End: 2025-06-15 | Stop reason: HOSPADM

## 2025-06-09 RX ORDER — NALOXONE HYDROCHLORIDE 0.4 MG/ML
0.1 INJECTION, SOLUTION INTRAMUSCULAR; INTRAVENOUS; SUBCUTANEOUS
Status: DISCONTINUED | OUTPATIENT
Start: 2025-06-09 | End: 2025-06-09 | Stop reason: ALTCHOICE

## 2025-06-09 RX ORDER — HEPARIN SODIUM 5000 [USP'U]/.5ML
5000 INJECTION, SOLUTION INTRAVENOUS; SUBCUTANEOUS
Status: COMPLETED | OUTPATIENT
Start: 2025-06-09 | End: 2025-06-09

## 2025-06-09 RX ORDER — DEXAMETHASONE SODIUM PHOSPHATE 4 MG/ML
4 INJECTION, SOLUTION INTRA-ARTICULAR; INTRALESIONAL; INTRAMUSCULAR; INTRAVENOUS; SOFT TISSUE
Status: DISCONTINUED | OUTPATIENT
Start: 2025-06-09 | End: 2025-06-09 | Stop reason: ALTCHOICE

## 2025-06-09 RX ORDER — OXYCODONE HYDROCHLORIDE 5 MG/1
5 TABLET ORAL
Refills: 0 | Status: CANCELLED | OUTPATIENT
Start: 2025-06-09

## 2025-06-09 RX ORDER — SODIUM CHLORIDE, SODIUM LACTATE, POTASSIUM CHLORIDE, CALCIUM CHLORIDE 600; 310; 30; 20 MG/100ML; MG/100ML; MG/100ML; MG/100ML
INJECTION, SOLUTION INTRAVENOUS CONTINUOUS PRN
Status: DISCONTINUED | OUTPATIENT
Start: 2025-06-09 | End: 2025-06-09

## 2025-06-09 RX ORDER — NALOXONE HYDROCHLORIDE 0.4 MG/ML
0.1 INJECTION, SOLUTION INTRAMUSCULAR; INTRAVENOUS; SUBCUTANEOUS
Status: CANCELLED | OUTPATIENT
Start: 2025-06-09

## 2025-06-09 RX ORDER — ONDANSETRON 4 MG/1
4 TABLET, ORALLY DISINTEGRATING ORAL EVERY 30 MIN PRN
Status: CANCELLED | OUTPATIENT
Start: 2025-06-09

## 2025-06-09 RX ORDER — ENOXAPARIN SODIUM 100 MG/ML
40 INJECTION SUBCUTANEOUS EVERY 24 HOURS
Status: DISCONTINUED | OUTPATIENT
Start: 2025-06-10 | End: 2025-06-15 | Stop reason: HOSPADM

## 2025-06-09 RX ORDER — BUPIVACAINE HCL/EPINEPHRINE 0.25-.0005
VIAL (ML) INJECTION PRN
Status: DISCONTINUED | OUTPATIENT
Start: 2025-06-09 | End: 2025-06-09 | Stop reason: HOSPADM

## 2025-06-09 RX ORDER — ONDANSETRON 2 MG/ML
4 INJECTION INTRAMUSCULAR; INTRAVENOUS EVERY 30 MIN PRN
Status: CANCELLED | OUTPATIENT
Start: 2025-06-09

## 2025-06-09 RX ORDER — FENTANYL CITRATE 50 UG/ML
INJECTION, SOLUTION INTRAMUSCULAR; INTRAVENOUS PRN
Status: DISCONTINUED | OUTPATIENT
Start: 2025-06-09 | End: 2025-06-09

## 2025-06-09 RX ORDER — ONDANSETRON 4 MG/1
4 TABLET, ORALLY DISINTEGRATING ORAL EVERY 6 HOURS PRN
Status: DISCONTINUED | OUTPATIENT
Start: 2025-06-09 | End: 2025-06-15 | Stop reason: HOSPADM

## 2025-06-09 RX ORDER — ACETAMINOPHEN 325 MG/1
975 TABLET ORAL EVERY 6 HOURS
Status: DISCONTINUED | OUTPATIENT
Start: 2025-06-09 | End: 2025-06-15 | Stop reason: HOSPADM

## 2025-06-09 RX ORDER — SODIUM CHLORIDE, SODIUM LACTATE, POTASSIUM CHLORIDE, CALCIUM CHLORIDE 600; 310; 30; 20 MG/100ML; MG/100ML; MG/100ML; MG/100ML
INJECTION, SOLUTION INTRAVENOUS CONTINUOUS
Status: DISCONTINUED | OUTPATIENT
Start: 2025-06-09 | End: 2025-06-09 | Stop reason: HOSPADM

## 2025-06-09 RX ORDER — FENTANYL CITRATE 50 UG/ML
50 INJECTION, SOLUTION INTRAMUSCULAR; INTRAVENOUS EVERY 5 MIN PRN
Status: DISCONTINUED | OUTPATIENT
Start: 2025-06-09 | End: 2025-06-09 | Stop reason: ALTCHOICE

## 2025-06-09 RX ORDER — OXYCODONE HYDROCHLORIDE 10 MG/1
10 TABLET ORAL EVERY 4 HOURS PRN
Status: DISCONTINUED | OUTPATIENT
Start: 2025-06-09 | End: 2025-06-15 | Stop reason: HOSPADM

## 2025-06-09 RX ORDER — CEFAZOLIN SODIUM/WATER 2 G/20 ML
2 SYRINGE (ML) INTRAVENOUS SEE ADMIN INSTRUCTIONS
Status: DISCONTINUED | OUTPATIENT
Start: 2025-06-09 | End: 2025-06-09 | Stop reason: HOSPADM

## 2025-06-09 RX ORDER — HYDROMORPHONE HCL IN WATER/PF 6 MG/30 ML
0.2 PATIENT CONTROLLED ANALGESIA SYRINGE INTRAVENOUS EVERY 5 MIN PRN
Status: DISCONTINUED | OUTPATIENT
Start: 2025-06-09 | End: 2025-06-09 | Stop reason: ALTCHOICE

## 2025-06-09 RX ORDER — FAMOTIDINE 20 MG/1
20 TABLET, FILM COATED ORAL 2 TIMES DAILY
Status: DISCONTINUED | OUTPATIENT
Start: 2025-06-09 | End: 2025-06-15 | Stop reason: HOSPADM

## 2025-06-09 RX ORDER — PROCHLORPERAZINE MALEATE 5 MG/1
10 TABLET ORAL EVERY 6 HOURS PRN
Status: DISCONTINUED | OUTPATIENT
Start: 2025-06-09 | End: 2025-06-15 | Stop reason: HOSPADM

## 2025-06-09 RX ORDER — AMOXICILLIN 250 MG
1 CAPSULE ORAL 2 TIMES DAILY
Status: DISCONTINUED | OUTPATIENT
Start: 2025-06-09 | End: 2025-06-15 | Stop reason: HOSPADM

## 2025-06-09 RX ORDER — ONDANSETRON 2 MG/ML
4 INJECTION INTRAMUSCULAR; INTRAVENOUS EVERY 30 MIN PRN
Status: DISCONTINUED | OUTPATIENT
Start: 2025-06-09 | End: 2025-06-09 | Stop reason: ALTCHOICE

## 2025-06-09 RX ORDER — ONDANSETRON 2 MG/ML
4 INJECTION INTRAMUSCULAR; INTRAVENOUS EVERY 6 HOURS PRN
Status: DISCONTINUED | OUTPATIENT
Start: 2025-06-09 | End: 2025-06-15 | Stop reason: HOSPADM

## 2025-06-09 RX ORDER — ONDANSETRON 2 MG/ML
INJECTION INTRAMUSCULAR; INTRAVENOUS PRN
Status: DISCONTINUED | OUTPATIENT
Start: 2025-06-09 | End: 2025-06-09

## 2025-06-09 RX ORDER — HYDROMORPHONE HCL IN WATER/PF 6 MG/30 ML
0.4 PATIENT CONTROLLED ANALGESIA SYRINGE INTRAVENOUS
Status: DISCONTINUED | OUTPATIENT
Start: 2025-06-09 | End: 2025-06-15 | Stop reason: HOSPADM

## 2025-06-09 RX ADMIN — METHOCARBAMOL 500 MG: 500 TABLET ORAL at 21:10

## 2025-06-09 RX ADMIN — ONDANSETRON 4 MG: 2 INJECTION INTRAMUSCULAR; INTRAVENOUS at 11:21

## 2025-06-09 RX ADMIN — PHENYLEPHRINE HYDROCHLORIDE 150 MCG: 10 INJECTION INTRAVENOUS at 11:33

## 2025-06-09 RX ADMIN — LIDOCAINE HYDROCHLORIDE 80 MG: 20 INJECTION, SOLUTION INFILTRATION; PERINEURAL at 09:44

## 2025-06-09 RX ADMIN — DEXMEDETOMIDINE HYDROCHLORIDE 4 MCG: 100 INJECTION, SOLUTION INTRAVENOUS at 11:14

## 2025-06-09 RX ADMIN — PHENYLEPHRINE HYDROCHLORIDE 100 MCG: 10 INJECTION INTRAVENOUS at 10:46

## 2025-06-09 RX ADMIN — PHENYLEPHRINE HYDROCHLORIDE 200 MCG: 10 INJECTION INTRAVENOUS at 11:25

## 2025-06-09 RX ADMIN — HEPARIN SODIUM 5000 UNITS: 5000 INJECTION, SOLUTION INTRAVENOUS; SUBCUTANEOUS at 08:32

## 2025-06-09 RX ADMIN — Medication 30 MG: at 10:23

## 2025-06-09 RX ADMIN — PHENYLEPHRINE HYDROCHLORIDE 100 MCG: 10 INJECTION INTRAVENOUS at 11:21

## 2025-06-09 RX ADMIN — FENTANYL CITRATE 50 MCG: 50 INJECTION INTRAMUSCULAR; INTRAVENOUS at 10:23

## 2025-06-09 RX ADMIN — DEXMEDETOMIDINE HYDROCHLORIDE 8 MCG: 100 INJECTION, SOLUTION INTRAVENOUS at 11:00

## 2025-06-09 RX ADMIN — HYDROMORPHONE HYDROCHLORIDE 0.2 MG: 0.2 INJECTION, SOLUTION INTRAMUSCULAR; INTRAVENOUS; SUBCUTANEOUS at 17:22

## 2025-06-09 RX ADMIN — PHENYLEPHRINE HYDROCHLORIDE 100 MCG: 10 INJECTION INTRAVENOUS at 11:15

## 2025-06-09 RX ADMIN — ACETAMINOPHEN 975 MG: 325 TABLET, FILM COATED ORAL at 20:16

## 2025-06-09 RX ADMIN — PHENYLEPHRINE HYDROCHLORIDE 100 MCG: 10 INJECTION INTRAVENOUS at 10:58

## 2025-06-09 RX ADMIN — Medication 100 MG: at 11:38

## 2025-06-09 RX ADMIN — Medication 20 MG: at 10:49

## 2025-06-09 RX ADMIN — PHENYLEPHRINE HYDROCHLORIDE 100 MCG: 10 INJECTION INTRAVENOUS at 10:53

## 2025-06-09 RX ADMIN — HYDROMORPHONE HYDROCHLORIDE 0.4 MG: 0.2 INJECTION, SOLUTION INTRAMUSCULAR; INTRAVENOUS; SUBCUTANEOUS at 20:15

## 2025-06-09 RX ADMIN — HYDROMORPHONE HYDROCHLORIDE 0.5 MG: 1 INJECTION, SOLUTION INTRAMUSCULAR; INTRAVENOUS; SUBCUTANEOUS at 11:38

## 2025-06-09 RX ADMIN — MIDAZOLAM 2 MG: 1 INJECTION INTRAMUSCULAR; INTRAVENOUS at 09:35

## 2025-06-09 RX ADMIN — PHENYLEPHRINE HYDROCHLORIDE 150 MCG: 10 INJECTION INTRAVENOUS at 11:43

## 2025-06-09 RX ADMIN — OXYCODONE HYDROCHLORIDE 10 MG: 10 TABLET ORAL at 15:59

## 2025-06-09 RX ADMIN — PROPOFOL 180 MG: 10 INJECTION, EMULSION INTRAVENOUS at 09:44

## 2025-06-09 RX ADMIN — SODIUM CHLORIDE, SODIUM LACTATE, POTASSIUM CHLORIDE, AND CALCIUM CHLORIDE: .6; .31; .03; .02 INJECTION, SOLUTION INTRAVENOUS at 09:36

## 2025-06-09 RX ADMIN — HYDROMORPHONE HYDROCHLORIDE 0.5 MG: 1 INJECTION, SOLUTION INTRAMUSCULAR; INTRAVENOUS; SUBCUTANEOUS at 11:16

## 2025-06-09 RX ADMIN — ONDANSETRON 4 MG: 2 INJECTION INTRAMUSCULAR; INTRAVENOUS at 13:09

## 2025-06-09 RX ADMIN — Medication 70 MG: at 09:45

## 2025-06-09 RX ADMIN — PROCHLORPERAZINE EDISYLATE 10 MG: 5 INJECTION INTRAMUSCULAR; INTRAVENOUS at 14:20

## 2025-06-09 RX ADMIN — METHOCARBAMOL 500 MG: 500 TABLET ORAL at 14:58

## 2025-06-09 RX ADMIN — ACETAMINOPHEN 975 MG: 325 TABLET, FILM COATED ORAL at 15:57

## 2025-06-09 RX ADMIN — FAMOTIDINE 20 MG: 20 TABLET, FILM COATED ORAL at 21:08

## 2025-06-09 RX ADMIN — PHENYLEPHRINE HYDROCHLORIDE 200 MCG: 10 INJECTION INTRAVENOUS at 10:42

## 2025-06-09 RX ADMIN — PHENYLEPHRINE HYDROCHLORIDE 100 MCG: 10 INJECTION INTRAVENOUS at 11:09

## 2025-06-09 RX ADMIN — Medication 10 MG: at 11:15

## 2025-06-09 RX ADMIN — DEXMEDETOMIDINE HYDROCHLORIDE 8 MCG: 100 INJECTION, SOLUTION INTRAVENOUS at 10:57

## 2025-06-09 RX ADMIN — Medication 2 G: at 09:59

## 2025-06-09 RX ADMIN — PHENYLEPHRINE HYDROCHLORIDE 200 MCG: 10 INJECTION INTRAVENOUS at 10:49

## 2025-06-09 RX ADMIN — FENTANYL CITRATE 50 MCG: 50 INJECTION INTRAMUSCULAR; INTRAVENOUS at 09:44

## 2025-06-09 ASSESSMENT — ACTIVITIES OF DAILY LIVING (ADL)
ADLS_ACUITY_SCORE: 23

## 2025-06-09 ASSESSMENT — ENCOUNTER SYMPTOMS
DYSRHYTHMIAS: 0
SEIZURES: 0

## 2025-06-09 ASSESSMENT — LIFESTYLE VARIABLES: TOBACCO_USE: 1

## 2025-06-09 NOTE — OR NURSING
Yuly Burton notified regarding completion of chest xray in PACU. Patient is appropriate to go to inpatient care unit.   Per Yuly Burton, patient should transfer to inpatient care unit with chest tube on suction.

## 2025-06-09 NOTE — ANESTHESIA PREPROCEDURE EVALUATION
Anesthesia Pre-Procedure Evaluation    Patient: Trevor Cardenas   MRN: [id number] : 2000          Procedure : Procedure(s):  PLEURODESIS, THORACOSCOPIC, blebectomy         Past Medical History:   Diagnosis Date    Primary spontaneous pneumothorax       Past Surgical History:   Procedure Laterality Date    ESOPHAGOSCOPY, GASTROSCOPY, DUODENOSCOPY (EGD), COMBINED N/A 2021    Procedure: ESOPHAGOGASTRODUODENOSCOPY, WITH BIOPSY;  Surgeon: Alex Sarmiento MD;  Location: [facility] OR    MOUTH SURGERY      THORACIC SURGERY  2025    chest tube for spontaneous lung collapse    [place] teeth extraction        No Known Allergies   Social History     Tobacco Use    Smoking status: Former     Types: Other    Smokeless tobacco: Never    Tobacco comments:     No tobacco hx/Cannabis only  daily x 8 years     Uses nicotine pouches daily   Substance Use Topics    Alcohol use: Not Currently     Comment: 12 Drinks week      Wt Readings from Last 1 Encounters:   25 68.7 kg (151 lb 7.3 oz)        Anesthesia Evaluation   Pt has had prior anesthetic. Type: MAC.    No history of anesthetic complications       ROS/MED HX  ENT/Pulmonary: Comment: Primary spontaneous pneumothorax in , and 2024, last one treated with chest tube.     Currently using nicotine pouches    (+)                tobacco use, Past use,                    (-) recent URI   Neurologic:    (-) no seizures   Cardiovascular:     (+)  - -   -  - -                                 Previous cardiac testing   Echo: Date: Results:    Stress Test:  Date: Results:    ECG Reviewed:  Date: 2025 Results:  Normal sinus rhythm with sinus arrhythmia  Left axis deviation  Incomplete right bundle branch block  Abnormal ECG    Cath:  Date: Results:   (-) taking anticoagulants/antiplatelets, NATARAJAN and arrhythmias   METS/Exercise Tolerance: >4 METS    Hematologic:    (-) history of blood clots and history of blood transfusion   Musculoskeletal:  - neg  "musculoskeletal ROS     GI/Hepatic:    (-) GERD   Renal/Genitourinary:  - neg Renal ROS     Endo:  - neg endo ROS     Psychiatric/Substance Use:    (-) psychiatric history   Infectious Disease:  - neg infectious disease ROS     Malignancy:  - neg malignancy ROS     Other:  - neg other ROS            Physical Exam  Airway  Mallampati: II  TM distance: >3 FB  Neck ROM: full  Mouth opening: >= 4 cm    Cardiovascular   Rhythm: regular  Rate: normal rate     Dental   (+) Completely normal teeth      Pulmonary Breath sounds clear to auscultation        Neurological   He appears awake.    Other Findings       OUTSIDE LABS:  CBC:   Lab Results   Component Value Date    WBC 5.2 10/12/2021    HGB 14.6 10/12/2021    HCT 42.2 10/12/2021     10/12/2021     BMP:   Lab Results   Component Value Date     10/12/2021    POTASSIUM 3.9 10/12/2021    CHLORIDE 108 10/12/2021    CO2 29 10/12/2021    BUN 13 10/12/2021    CR 1.22 10/12/2021     (H) 06/09/2025    GLC 92 10/12/2021     COAGS: No results found for: \"PTT\", \"INR\", \"FIBR\"  POC: No results found for: \"BGM\", \"HCG\", \"HCGS\"  HEPATIC: No results found for: \"ALBUMIN\", \"PROTTOTAL\", \"ALT\", \"AST\", \"GGT\", \"ALKPHOS\", \"BILITOTAL\", \"BILIDIRECT\", \"VISH\"  OTHER:   Lab Results   Component Value Date    ABDIEL 8.8 10/12/2021    CRP <2.9 10/12/2021    SED 7 10/12/2021       Anesthesia Plan    ASA Status:  2      NPO Status: NPO Appropriate   Anesthesia Type: General.  Airway: oral.  Induction: intravenous.  Maintenance: Balanced.   Techniques and Equipment:     - Airway:  Planned airway equipment includes double lumen endotracheal tube.  ETT Double Lumen (Fr): 39     - Monitoring Plan: standard ASA monitoring, processed EEG monitor  Equipment Comments: 41Fr available     Consents    Anesthesia Plan(s) and associated risks, benefits, and realistic alternatives discussed. Questions answered and patient/representative(s) expressed understanding.     - Discussed: anesthesiologist     " - Discussed with:  Patient        - Pt is DNR/DNI Status: no DNR     Blood Consent:      - Discussed with: patient.     Postoperative Care    Pain management: non-narcotic analgesics, plan for postoperative opioid use.     Comments:    Other Comments: I discussed the risks and benefits of general anesthesia with the patient.  Questions were sought and answered.      Job Berman MD  Attending Anesthesiologist                  Job Berman MD    I have reviewed the pertinent notes and labs in the chart from the past 30 days and (re)examined the patient.  Any updates or changes from those notes are reflected in this note.    Clinically Significant Risk Factors Present on Admission

## 2025-06-09 NOTE — LETTER
Transition Communication Hand-off for Care Transitions to Next Level of Care Provider    Name: Trevor Cardenas  : 2000  MRN #: [id number]  Primary Care Provider: Physician No Ref-Primary     Primary Clinic: No address on file     Reason for Hospitalization:  Primary spontaneous pneumothorax [J93.11]  Spontaneous pneumothorax [J93.83]  Admit Date/Time: 2025  7:18 AM  Discharge Date: ***  Payor Source: Payor: MEDICA / Plan: MEDICA VANTAGE PLUS SELF INSURED / Product Type: Indemnity /     Readmission Assessment Measure (MANDI) Risk Score/category: ***    Plan of Care Goals/Milestone Events:   Patient Concerns: ***   Patient Goals:   Short-term ***   Long-term ***   Medical Goals   Short-term ***   Long-term ***         Reason for Communication Hand-off Referral: {CCREASONCMCTNHANDOFFREFERRALCTS:34099}    Discharge Plan:       Concern for non-adherence with plan of care:   Y/N ***  Discharge Needs Assessment:  Needs      Flowsheet Row Most Recent Value   Equipment Currently Used at Home none            Already enrolled in Tele-monitoring program and name of program:  ***  Follow-up specialty is recommended: {YES / NO:80859}    Follow-up plan:    Future Appointments   Date Time Provider Department Center   2025  2:20 PM [facility]   2025  3:00 PM Shona Guevara APRN [facility] [[email or web address]       Any outstanding tests or procedures:        Referrals       Future Labs/Procedures    Hospital to Primary Care - Establish PCP Referral     Comments:    Please be aware that coverage of these services is subject to the terms and limitations of your health insurance plan.  Call member services at your health plan with any benefit or coverage questions.              Key Recommendations:      Shona Guillen RN    AVS/Discharge Summary is the source of truth; this is a helpful guide for improved communication of patient story

## 2025-06-09 NOTE — PROGRESS NOTES
"Post Op Check    06/09/2025    Trevor Cardenas is a 24 year old male with h/o left spontaneous pneumothorax now POD#0 s/p thoracoscopic blebectomy, pleurodesis.    Patient seen at the bedside awake, alert, and interactive. He reports incisional and chest tube associated pain that is tolerable on current regimen. Denies SOB, chest pain, or dizziness. Has had some sips of clears and denies nausea/vomiting. Has not gotten out of bed yet; waiting for bed upstairs.    /81   Pulse 80   Temp 97.8  F (36.6  C) (Axillary)   Resp 20   Ht 1.854 m (6' 1\")   Wt 68.7 kg (151 lb 7.3 oz)   SpO2 98%   BMI 19.98 kg/m      Gen: A&O x3, NAD  Chest: breathing non-labored on room air. Incisions covered with dressing. Left chest tube in place to -20 cm suction with thin, sanguinous output.  Abdomen: soft, non-distended    CXR: Left apical chest tube, no pneuthorax    A/P: No acute post-op issues.     Colette Ashford  PGY-3 General Surgery   "

## 2025-06-09 NOTE — ANESTHESIA PROCEDURE NOTES
Airway       Patient location during procedure: OR       Procedure Start/Stop Times: 6/9/2025 9:47 AM  Staff -        Anesthesiologist:  Job Berman MD       CRNA: Papi Segal APRN CRNA       Performed By: anesthesiologist and CRNA  Consent for Airway        Urgency: elective  Indications and Patient Condition       Indications for airway management: jim-procedural       Induction type:intravenous       Mask difficulty assessment: 1 - vent by mask    Final Airway Details       Final airway type: endotracheal airway       Successful airway: ETT - double lumen left  Endotracheal Airway Details        Cuffed: yes       Cuff volume (mL): 7       Bronchial cuff pressure (cm H2O): 3       Successful intubation technique: direct laryngoscopy       DL Blade Type: [place] 2       Grade View of Cords: 1       Adjucts: stylet       Position: Center       Bite block used: None       ETT Double lumen (fr): 39    Post intubation assessment        Placement verified by: capnometry, equal breath sounds and chest rise        Number of attempts at approach: 1       Number of other approaches attempted: 0       Secured with: tape       Ease of procedure: easy       Dentition: Intact and Unchanged       Dental guard used and removed. Dental Guard Type: Standard White.    Medication(s) Administered   Medication Administration Time: 6/9/2025 9:47 AM

## 2025-06-09 NOTE — BRIEF OP NOTE
[facility]    Brief Operative Note    Pre-operative diagnosis: Primary spontaneous pneumothorax [J93.11]  Post-operative diagnosis Same as pre-operative diagnosis    Procedure: PLEURODESIS, THORACOSCOPIC, Blebectomy, Partial Pleurectomy, Left - Chest    Surgeon: Surgeons and Role:     * Ale Eddy MD - Primary     * Yuly Burton PA-C - Assisting  Anesthesia: General   Estimated Blood Loss: 30 mL from 6/9/2025  9:35 AM to 6/9/2025 12:06 PM      Drains: 28Fr straight chest tube  Specimens:   ID Type Source Tests Collected by Time Destination   1 : Left Upper lobe wedge Tissue Lung, Upper Lobe, Left SURGICAL PATHOLOGY EXAM Ale Eddy MD 6/9/2025 10:44 AM      Findings:  A few small blebs identified and pleurodesis and bleb resection completed.    Complications: None.  Implants: * No implants in log *

## 2025-06-09 NOTE — ANESTHESIA CARE TRANSFER NOTE
Patient: Trevor Cardenas    Procedure: Procedure(s):  PLEURODESIS, THORACOSCOPIC, Blebectomy, Partial Pleurectomy       Diagnosis: Primary spontaneous pneumothorax [J93.11]  Diagnosis Additional Information: No value filed.    Anesthesia Type:   General     Note:    Oropharynx: oropharynx clear of all foreign objects  Level of Consciousness: awake  Oxygen Supplementation: nasal cannula  Level of Supplemental Oxygen (L/min / FiO2): 2  Independent Airway: airway patency satisfactory and stable  Dentition: dentition unchanged  Vital Signs Stable: post-procedure vital signs reviewed and stable  Report to RN Given: handoff report given  Patient transferred to: PACU    Handoff Report: Identifed the Patient, Identified the Reponsible Provider, Reviewed the pertinent medical history, Discussed the surgical course, Reviewed Intra-OP anesthesia mangement and issues during anesthesia, Set expectations for post-procedure period and Allowed opportunity for questions and acknowledgement of understanding      Vitals:  Vitals Value Taken Time   /66 06/09/25 12:11   Temp 98.1    Pulse 74 06/09/25 12:14   Resp 19 06/09/25 12:14   SpO2 100 % 06/09/25 12:14   Vitals shown include unfiled device data.    Electronically Signed By: TERESA Hassan CRNA  June 9, 2025  12:14 PM

## 2025-06-09 NOTE — ANESTHESIA POSTPROCEDURE EVALUATION
Patient: Trevor Cardenas    Procedure: Procedure(s):  PLEURODESIS, THORACOSCOPIC, Blebectomy, Partial Pleurectomy       Anesthesia Type:  General    Note:  Disposition: Inpatient   Postop Pain Control: Uneventful            Sign Out: Well controlled pain   PONV: No   Neuro/Psych: Uneventful            Sign Out: Acceptable/Baseline neuro status   Airway/Respiratory: Uneventful            Sign Out: Acceptable/Baseline resp. status   CV/Hemodynamics: Uneventful            Sign Out: Acceptable CV status; No obvious hypovolemia; No obvious fluid overload   Other NRE: NONE   DID A NON-ROUTINE EVENT OCCUR? No           Last vitals:  Vitals Value Taken Time   /58 06/09/25 13:00   Temp 36.6  C (97.8  F) 06/09/25 13:00   Pulse 70 06/09/25 13:08   Resp 25 06/09/25 13:08   SpO2 100 % 06/09/25 13:08   Vitals shown include unfiled device data.    Electronically Signed By: Morgan Johnson MD  June 9, 2025  1:08 PM

## 2025-06-10 ENCOUNTER — APPOINTMENT (OUTPATIENT)
Dept: GENERAL RADIOLOGY | Facility: CLINIC | Age: 25
DRG: 165 | End: 2025-06-10
Payer: COMMERCIAL

## 2025-06-10 ENCOUNTER — APPOINTMENT (OUTPATIENT)
Dept: OCCUPATIONAL THERAPY | Facility: CLINIC | Age: 25
End: 2025-06-10
Payer: COMMERCIAL

## 2025-06-10 LAB
ANION GAP SERPL CALCULATED.3IONS-SCNC: 11 MMOL/L (ref 7–15)
BUN SERPL-MCNC: 10.1 MG/DL (ref 6–20)
CALCIUM SERPL-MCNC: 9.1 MG/DL (ref 8.8–10.4)
CHLORIDE SERPL-SCNC: 103 MMOL/L (ref 98–107)
CREAT SERPL-MCNC: 1 MG/DL (ref 0.67–1.17)
EGFRCR SERPLBLD CKD-EPI 2021: >90 ML/MIN/1.73M2
ERYTHROCYTE [DISTWIDTH] IN BLOOD BY AUTOMATED COUNT: 12.1 % (ref 10–15)
GLUCOSE SERPL-MCNC: 104 MG/DL (ref 70–99)
HCO3 SERPL-SCNC: 24 MMOL/L (ref 22–29)
HCT VFR BLD AUTO: 40 % (ref 40–53)
HGB BLD-MCNC: 13.7 G/DL (ref 13.3–17.7)
MCH RBC QN AUTO: 30.2 PG (ref 26.5–33)
MCHC RBC AUTO-ENTMCNC: 34.3 G/DL (ref 31.5–36.5)
MCV RBC AUTO: 88 FL (ref 78–100)
PLATELET # BLD AUTO: 236 10E3/UL (ref 150–450)
POTASSIUM SERPL-SCNC: 4.2 MMOL/L (ref 3.4–5.3)
RBC # BLD AUTO: 4.54 10E6/UL (ref 4.4–5.9)
SODIUM SERPL-SCNC: 138 MMOL/L (ref 135–145)
WBC # BLD AUTO: 6.9 10E3/UL (ref 4–11)

## 2025-06-10 PROCEDURE — 250N000013 HC RX MED GY IP 250 OP 250 PS 637

## 2025-06-10 PROCEDURE — 999N000147 HC STATISTIC PT IP EVAL DEFER

## 2025-06-10 PROCEDURE — 97535 SELF CARE MNGMENT TRAINING: CPT | Mod: GO

## 2025-06-10 PROCEDURE — 97530 THERAPEUTIC ACTIVITIES: CPT | Mod: GO

## 2025-06-10 PROCEDURE — 120N000002 HC R&B MED SURG/OB UMMC

## 2025-06-10 PROCEDURE — 36415 COLL VENOUS BLD VENIPUNCTURE: CPT

## 2025-06-10 PROCEDURE — 71045 X-RAY EXAM CHEST 1 VIEW: CPT

## 2025-06-10 PROCEDURE — 250N000011 HC RX IP 250 OP 636

## 2025-06-10 PROCEDURE — 97165 OT EVAL LOW COMPLEX 30 MIN: CPT | Mod: GO

## 2025-06-10 PROCEDURE — 85018 HEMOGLOBIN: CPT

## 2025-06-10 PROCEDURE — 80048 BASIC METABOLIC PNL TOTAL CA: CPT

## 2025-06-10 PROCEDURE — 71045 X-RAY EXAM CHEST 1 VIEW: CPT | Mod: 26 | Performed by: RADIOLOGY

## 2025-06-10 RX ORDER — IBUPROFEN 600 MG/1
600 TABLET, FILM COATED ORAL EVERY 6 HOURS
Status: DISCONTINUED | OUTPATIENT
Start: 2025-06-10 | End: 2025-06-15

## 2025-06-10 RX ORDER — HYDROXYZINE HYDROCHLORIDE 10 MG/1
30-50 TABLET, FILM COATED ORAL EVERY 6 HOURS PRN
Status: DISCONTINUED | OUTPATIENT
Start: 2025-06-10 | End: 2025-06-12 | Stop reason: DRUGHIGH

## 2025-06-10 RX ORDER — LIDOCAINE 4 G/G
1 PATCH TOPICAL
Status: DISCONTINUED | OUTPATIENT
Start: 2025-06-10 | End: 2025-06-15 | Stop reason: HOSPADM

## 2025-06-10 RX ORDER — METHOCARBAMOL 500 MG/1
1000 TABLET, FILM COATED ORAL 3 TIMES DAILY
Status: DISCONTINUED | OUTPATIENT
Start: 2025-06-10 | End: 2025-06-15 | Stop reason: HOSPADM

## 2025-06-10 RX ADMIN — LIDOCAINE 1 PATCH: 4 PATCH TOPICAL at 14:50

## 2025-06-10 RX ADMIN — METHOCARBAMOL 1000 MG: 500 TABLET ORAL at 13:46

## 2025-06-10 RX ADMIN — ACETAMINOPHEN 975 MG: 325 TABLET, FILM COATED ORAL at 21:23

## 2025-06-10 RX ADMIN — SENNOSIDES AND DOCUSATE SODIUM 1 TABLET: 50; 8.6 TABLET ORAL at 21:21

## 2025-06-10 RX ADMIN — FAMOTIDINE 20 MG: 20 TABLET, FILM COATED ORAL at 21:21

## 2025-06-10 RX ADMIN — HYDROMORPHONE HYDROCHLORIDE 0.4 MG: 0.2 INJECTION, SOLUTION INTRAMUSCULAR; INTRAVENOUS; SUBCUTANEOUS at 02:04

## 2025-06-10 RX ADMIN — OXYCODONE HYDROCHLORIDE 5 MG: 5 TABLET ORAL at 13:46

## 2025-06-10 RX ADMIN — SENNOSIDES AND DOCUSATE SODIUM 1 TABLET: 50; 8.6 TABLET ORAL at 08:19

## 2025-06-10 RX ADMIN — POLYETHYLENE GLYCOL 3350 17 G: 17 POWDER, FOR SOLUTION ORAL at 08:19

## 2025-06-10 RX ADMIN — HYDROMORPHONE HYDROCHLORIDE 0.2 MG: 0.2 INJECTION, SOLUTION INTRAMUSCULAR; INTRAVENOUS; SUBCUTANEOUS at 08:22

## 2025-06-10 RX ADMIN — FAMOTIDINE 20 MG: 20 TABLET, FILM COATED ORAL at 08:19

## 2025-06-10 RX ADMIN — ACETAMINOPHEN 975 MG: 325 TABLET, FILM COATED ORAL at 02:04

## 2025-06-10 RX ADMIN — ACETAMINOPHEN 975 MG: 325 TABLET, FILM COATED ORAL at 08:19

## 2025-06-10 RX ADMIN — METHOCARBAMOL 1000 MG: 500 TABLET ORAL at 08:19

## 2025-06-10 RX ADMIN — ENOXAPARIN SODIUM 40 MG: 40 INJECTION SUBCUTANEOUS at 08:19

## 2025-06-10 RX ADMIN — OXYCODONE HYDROCHLORIDE 5 MG: 5 TABLET ORAL at 17:43

## 2025-06-10 RX ADMIN — HYDROMORPHONE HYDROCHLORIDE 0.4 MG: 0.2 INJECTION, SOLUTION INTRAMUSCULAR; INTRAVENOUS; SUBCUTANEOUS at 06:23

## 2025-06-10 RX ADMIN — OXYCODONE HYDROCHLORIDE 5 MG: 5 TABLET ORAL at 09:27

## 2025-06-10 RX ADMIN — IBUPROFEN 600 MG: 600 TABLET, FILM COATED ORAL at 22:53

## 2025-06-10 RX ADMIN — IBUPROFEN 600 MG: 600 TABLET, FILM COATED ORAL at 17:11

## 2025-06-10 RX ADMIN — METHOCARBAMOL 1000 MG: 500 TABLET ORAL at 21:21

## 2025-06-10 ASSESSMENT — ACTIVITIES OF DAILY LIVING (ADL)
ADLS_ACUITY_SCORE: 23
ADLS_ACUITY_SCORE: 33
ADLS_ACUITY_SCORE: 33
ADLS_ACUITY_SCORE: 23
ADLS_ACUITY_SCORE: 33
ADLS_ACUITY_SCORE: 23
ADLS_ACUITY_SCORE: 23
ADLS_ACUITY_SCORE: 33
ADLS_ACUITY_SCORE: 33
ADLS_ACUITY_SCORE: 23
ADLS_ACUITY_SCORE: 33
ADLS_ACUITY_SCORE: 23
ADLS_ACUITY_SCORE: 23
ADLS_ACUITY_SCORE: 31
ADLS_ACUITY_SCORE: 33
ADLS_ACUITY_SCORE: 23
ADLS_ACUITY_SCORE: 33
ADLS_ACUITY_SCORE: 31
ADLS_ACUITY_SCORE: 33
ADLS_ACUITY_SCORE: 23
ADLS_ACUITY_SCORE: 33

## 2025-06-10 NOTE — PLAN OF CARE
[facility] [phone]    Goal Outcome Evaluation:      Plan of Care Reviewed With: patient    Overall Patient Progress: improvingOverall Patient Progress: improving    Outcome Evaluation: A&Ox4, VSS on RA, up ad luis ambulating halls, tolerating regular diet, pain controlled w/ prn oxycodone, LIDO patch on LL chest, L CT -20 suction, voiding, passing gas no BM.

## 2025-06-10 NOTE — PLAN OF CARE
Unit 7B: PT orders received and acknowledged. Per discussion with OT, patient is mobilizing independently and does not have any mobility concerns. No acute IP PT needs identified at this time, defer mobility and discharge recommendations to OT. Will complete PT orders. Please re-consult if new needs arise. Thank you for this referral!

## 2025-06-10 NOTE — PROGRESS NOTES
06/10/25 1234   Appointment Info   Signing Clinician's Name / Credentials (OT) ALEJANDRA Smith   Rehab Comments (OT) OT only, 1x eval/treat only   Living Environment   People in Home significant other   Current Living Arrangements apartment   Home Accessibility no concerns   Transportation Anticipated car, drives self;family or friend will provide   Living Environment Comments Pt lives in an apartment with SO, reports either flight of stairs or elevator access.   Self-Care   Usual Activity Tolerance excellent   Current Activity Tolerance good   Regular Exercise Yes   Activity/Exercise Type team sports   Equipment Currently Used at Home none   Fall history within last six months no   Activity/Exercise/Self-Care Comment pt reports IND at baseline with ADLs/BADLs   General Information   Onset of Illness/Injury or Date of Surgery 06/09/25   Referring Physician Yuly Burton PA-C   Patient/Family Therapy Goal Statement (OT) to go home   Additional Occupational Profile Info/Pertinent History of Current Problem Trevor Cardenas is a 24 year old male with h/o left spontaneous pneumothorax now POD#0 s/p thoracoscopic blebectomy, pleurodesis.   Cognitive Status Examination   Orientation Status orientation to person, place and time   Cognitive Status Comments Appears intact   Bed Mobility   Bed Mobility supine-sit   Supine-Sit [place] (Bed Mobility) verbal cues   Transfers   Transfers toilet transfer;shower transfer   Shower Transfer   Type (Shower Transfer) lateral   [place] Level (Shower Transfer) independent   Shower Transfer Comments per clinical judgement   Toilet Transfer   Type (Toilet Transfer) sit-stand   [place] Level (Toilet Transfer) independent   Toilet Transfer Comments per clinical judgement   Balance   Balance Comments no balanc deficits   Activities of Daily Living   BADL Assessment/Intervention bathing;lower body dressing;toileting   Bathing Assessment/Intervention    [place] Level (Bathing) set up;supervision   Comment, (Bathing) per clinical judgement   Lower Body Dressing Assessment/Training   [place] Level (Lower Body Dressing) independent   Comment, (Lower Body Dressing) per clinical judgement   Toileting   [place] Level (Toileting) independent   Comment, (Toileting) per clinical judgement   Clinical Impression   Criteria for Skilled Therapeutic Interventions Met (OT) Yes, treatment indicated   OT Diagnosis pt is below baseline for ADLs   OT Problem List-Impairments impacting ADL problems related to;activity tolerance impaired;pain   Assessment of Occupational Performance 1-3 Performance Deficits   Identified Performance Deficits home mangement, leisure, bathing   Planned Therapy Interventions (OT) ADL retraining;IADL retraining;transfer training;bed mobility training   Clinical Decision Making Complexity (OT) problem focused assessment/low complexity   Risk & Benefits of therapy have been explained evaluation/treatment results reviewed;care plan/treatment goals reviewed;risks/benefits reviewed;current/potential barriers reviewed;participants voiced agreement with care plan;participants included;patient   Clinical Impression Comments pt presents below baseline limited primarily by post op pain. pt will benefit from skilled OT to progress toward PLOF   OT Total Evaluation Time   OT Eval, Low Complexity Minutes (09543) 7   OT Goals   Therapy Frequency (OT) One time eval and treatment   OT Predicted Duration/Target Date for Goal Attainment 06/10/25   OT Goals Aerobic Activity;Bed Mobility   OT: Bed Mobility Independent;Goal Met   OT: Perform aerobic activity with stable cardiovascular response 10 minutes;Goal Met   Interventions   Interventions Quick Adds Therapeutic Activity;Self-Care/Home Management   Self-Care/Home Management   Self-Care/Home Mgmt/ADL, Compensatory, Meal Prep Minutes (68354) 8   Treatment Detail/Skilled Intervention pt educated on protective  precautions, with pt expressing understanding. pt asks good follow up with questions regarding preferred activities. Th educates within scope, writes follow up questions on WB for team when out of TH scope. Pt appreciative.   Therapeutic Activities   Therapeutic Activity Minutes (69879) 8   Treatment Detail/Skilled Intervention Facilitated hallway ambulation to progress pt activity tolerance for ADLs. Pt ambulates approx 400ft , SBA - IND. Pt tolerates well. TH educates on walking program w/pt expressing understanding. TH educates on bed mobility to reduce pain, pt demos log roll with good technique/understanding, Th A with managing CT only once returned to room. Pt denies other OT related concerns.   OT Discharge Planning   OT Plan d/c OT   OT Discharge Recommendation (DC Rec) home with assist   OT Rationale for DC Rec Pt mobilizing SBA-IND, anticipate safe to d/c home with SO A for heavy tasks   OT Brief overview of current status SBA - IND (just needs set up of CT)   OT Total Distance Amb During Session (feet) 400   Total Session Time   Timed Code Treatment Minutes 16   Total Session Time (sum of timed and untimed services) 23

## 2025-06-10 NOTE — PHARMACY-ADMISSION MEDICATION HISTORY
Pharmacist Admission Medication History    Admission medication history is complete. The information provided in this note is only as accurate as the sources available at the time of the update.    Information Source(s): Patient, Hospital records, and CareEverywhere/SureScripts via in-person    Pertinent Information:   - Conducted interview with patient at the bedside. Patient informed author that they were taking no prescription medications or OTCs/vitamins/supplements/herbals at home.  - 4/2/25 Admission Medication History note (Lillie Thakkar, PharmD) also states patient is not taking any PTA medications.    Changes made to PTA medication list:  Added: None  Deleted: None  Changed: None    Allergies reviewed with patient and updates made in EHR: yes    Medication History Completed By: Radha Beck RPH 6/10/2025 9:22 AM    No outpatient medications have been marked as taking for the 6/9/25 encounter (Hospital Encounter).

## 2025-06-10 NOTE — PROGRESS NOTES
Admitted/transferred from: PACU  2 RN skin assessment completed by Jessica Childers RN and Gia CASON RN    Skin assessment finding: Rash on upper chest right side. Scratch on right hand/FA. [place] and Chest tube site on the left side. 2x PIV.     Interventions/actions: No interventions needed.

## 2025-06-10 NOTE — PROGRESS NOTES
THORACIC & FOREGUT SURGERY    S:  No acute overnight events.  Pt seen at bedside resting comfortably.       O:  Vitals:    06/09/25 2004 06/09/25 2152 06/10/25 0555 06/10/25 0848   BP: (!) 150/82 123/80 133/88    BP Location:  Right arm Right arm    Cuff Size:       Pulse:  79 79    Resp: 16 18 18    Temp: 98.9  F (37.2  C) 98.1  F (36.7  C) 98.4  F (36.9  C)    TempSrc: Oral Oral Oral    SpO2: 97% 95%     Weight:    66.2 kg (145 lb 14.4 oz)   Height:           A&O, NAD  Breathing non-labored  Appears well perfused  ND  Distal extremities warm    No air leak, serosang CT output    A/P: Trevor Cardenas is a 24 year old male with h/o left spontaneous pneumothorax now POD#1 s/p thoracoscopic blebectomy, pleurodesis.   -Continue chest tube to suction for total of 48-72hrs postop  -Likely chest tube removal and dispo w/i 48hrs from now  -Multimodal pain control  -Lovenox ppx    POSTOP MEDICAL ISSUES: none     Clinically Significant Risk Factors                                         Seen and d/w staff    Marques Chou PA-C     Thoracic and Foregut Surgery  Text page Thoracic Surgery via [facility] Paging/Directory

## 2025-06-10 NOTE — PLAN OF CARE
Goal Outcome Evaluation:      Plan of Care Reviewed With: patient    Overall Patient Progress: no changeOverall Patient Progress: no change    Outcome Evaluation: RN assumes cares at [phone]    VSS on RA. A/O. Not oob post-op yet, writer attempted to get pt oob, but pt refuses at this time. 9/10 chest tube/surgical site pain managed with dilaudid x2 with relief. Denies nausea. Regular diet. Voiding in urinal. LBM: pta. 1x piv, 1x CT to suction. Scratches on right hand/FA, red rash on right upper chest. Continuous pulse ox, pt not wanting capno, O2 is stable.

## 2025-06-10 NOTE — PLAN OF CARE
Occupational Therapy Discharge Summary    Reason for therapy discharge:    All goals and outcomes met, no further needs identified.    Progress towards therapy goal(s). See goals on Care Plan in [facility] electronic health record for goal details.  Goals met    Therapy recommendation(s):    Continue home exercise program. Cont ambulating 4x daily

## 2025-06-11 ENCOUNTER — APPOINTMENT (OUTPATIENT)
Dept: GENERAL RADIOLOGY | Facility: CLINIC | Age: 25
End: 2025-06-11
Payer: COMMERCIAL

## 2025-06-11 LAB
GLUCOSE BLDC GLUCOMTR-MCNC: 105 MG/DL (ref 70–99)
RADIOLOGIST FLAGS: ABNORMAL

## 2025-06-11 PROCEDURE — 71045 X-RAY EXAM CHEST 1 VIEW: CPT | Mod: 77

## 2025-06-11 PROCEDURE — 250N000013 HC RX MED GY IP 250 OP 250 PS 637

## 2025-06-11 PROCEDURE — 71045 X-RAY EXAM CHEST 1 VIEW: CPT | Mod: 26 | Performed by: RADIOLOGY

## 2025-06-11 PROCEDURE — 250N000011 HC RX IP 250 OP 636

## 2025-06-11 PROCEDURE — 71045 X-RAY EXAM CHEST 1 VIEW: CPT

## 2025-06-11 PROCEDURE — 120N000002 HC R&B MED SURG/OB UMMC

## 2025-06-11 RX ORDER — OXYCODONE HYDROCHLORIDE 5 MG/1
5 TABLET ORAL EVERY 4 HOURS PRN
Qty: 12 TABLET | Refills: 0 | Status: SHIPPED | OUTPATIENT
Start: 2025-06-11 | End: 2025-06-15

## 2025-06-11 RX ORDER — ONDANSETRON 4 MG/1
4 TABLET, ORALLY DISINTEGRATING ORAL EVERY 6 HOURS PRN
Qty: 20 TABLET | Refills: 0 | Status: SHIPPED | OUTPATIENT
Start: 2025-06-11

## 2025-06-11 RX ORDER — POLYETHYLENE GLYCOL 3350 17 G/17G
17 POWDER, FOR SOLUTION ORAL DAILY
Qty: 510 G | Refills: 0 | Status: SHIPPED | OUTPATIENT
Start: 2025-06-11

## 2025-06-11 RX ORDER — AMOXICILLIN 250 MG
1 CAPSULE ORAL 2 TIMES DAILY
Qty: 30 TABLET | Refills: 0 | Status: SHIPPED | OUTPATIENT
Start: 2025-06-11

## 2025-06-11 RX ORDER — LIDOCAINE 4 G/G
1 PATCH TOPICAL EVERY 24 HOURS
Qty: 10 PATCH | Refills: 0 | Status: SHIPPED | OUTPATIENT
Start: 2025-06-11

## 2025-06-11 RX ORDER — METHOCARBAMOL 1000 MG/1
1000 TABLET, FILM COATED ORAL 3 TIMES DAILY
Qty: 45 TABLET | Refills: 0 | Status: SHIPPED | OUTPATIENT
Start: 2025-06-11 | End: 2025-06-26

## 2025-06-11 RX ORDER — BISACODYL 10 MG
10 SUPPOSITORY, RECTAL RECTAL DAILY PRN
Qty: 10 SUPPOSITORY | Refills: 0 | Status: SHIPPED | OUTPATIENT
Start: 2025-06-12

## 2025-06-11 RX ORDER — ACETAMINOPHEN 325 MG/1
975 TABLET ORAL EVERY 6 HOURS
Qty: 300 TABLET | Refills: 0 | Status: SHIPPED | OUTPATIENT
Start: 2025-06-11

## 2025-06-11 RX ORDER — IBUPROFEN 600 MG/1
600 TABLET, FILM COATED ORAL EVERY 6 HOURS
Qty: 100 TABLET | Refills: 0 | Status: SHIPPED | OUTPATIENT
Start: 2025-06-11

## 2025-06-11 RX ORDER — HYDROXYZINE HYDROCHLORIDE 10 MG/1
30-50 TABLET, FILM COATED ORAL EVERY 6 HOURS PRN
Qty: 15 TABLET | Refills: 0 | Status: SHIPPED | OUTPATIENT
Start: 2025-06-11

## 2025-06-11 RX ADMIN — SENNOSIDES AND DOCUSATE SODIUM 1 TABLET: 50; 8.6 TABLET ORAL at 20:25

## 2025-06-11 RX ADMIN — OXYCODONE HYDROCHLORIDE 10 MG: 10 TABLET ORAL at 12:12

## 2025-06-11 RX ADMIN — LIDOCAINE 1 PATCH: 4 PATCH TOPICAL at 08:46

## 2025-06-11 RX ADMIN — OXYCODONE HYDROCHLORIDE 5 MG: 5 TABLET ORAL at 06:44

## 2025-06-11 RX ADMIN — ENOXAPARIN SODIUM 40 MG: 40 INJECTION SUBCUTANEOUS at 08:48

## 2025-06-11 RX ADMIN — FAMOTIDINE 20 MG: 20 TABLET, FILM COATED ORAL at 08:47

## 2025-06-11 RX ADMIN — ACETAMINOPHEN 975 MG: 325 TABLET, FILM COATED ORAL at 20:26

## 2025-06-11 RX ADMIN — METHOCARBAMOL 1000 MG: 500 TABLET ORAL at 14:14

## 2025-06-11 RX ADMIN — POLYETHYLENE GLYCOL 3350 17 G: 17 POWDER, FOR SOLUTION ORAL at 08:47

## 2025-06-11 RX ADMIN — ACETAMINOPHEN 975 MG: 325 TABLET, FILM COATED ORAL at 03:27

## 2025-06-11 RX ADMIN — IBUPROFEN 600 MG: 600 TABLET, FILM COATED ORAL at 06:42

## 2025-06-11 RX ADMIN — IBUPROFEN 600 MG: 600 TABLET, FILM COATED ORAL at 17:17

## 2025-06-11 RX ADMIN — FAMOTIDINE 20 MG: 20 TABLET, FILM COATED ORAL at 20:25

## 2025-06-11 RX ADMIN — METHOCARBAMOL 1000 MG: 500 TABLET ORAL at 08:47

## 2025-06-11 RX ADMIN — ACETAMINOPHEN 975 MG: 325 TABLET, FILM COATED ORAL at 14:14

## 2025-06-11 RX ADMIN — IBUPROFEN 600 MG: 600 TABLET, FILM COATED ORAL at 10:17

## 2025-06-11 RX ADMIN — SENNOSIDES AND DOCUSATE SODIUM 1 TABLET: 50; 8.6 TABLET ORAL at 08:47

## 2025-06-11 RX ADMIN — METHOCARBAMOL 1000 MG: 500 TABLET ORAL at 20:25

## 2025-06-11 RX ADMIN — ACETAMINOPHEN 975 MG: 325 TABLET, FILM COATED ORAL at 08:47

## 2025-06-11 ASSESSMENT — ACTIVITIES OF DAILY LIVING (ADL)
ADLS_ACUITY_SCORE: 33

## 2025-06-11 NOTE — PROGRESS NOTES
THORACIC & FOREGUT SURGERY    S:  No acute overnight events.  Pt seen at bedside resting comfortably.  Pain improved.     O:  Vitals:    06/10/25 0848 06/10/25 1430 06/10/25 2130 06/11/25 0617   BP:  122/82 129/82 119/70   BP Location:  Right arm Right arm Right arm   Pulse:  69 72 66   Resp:  18 18 18   Temp:  97.9  F (36.6  C) 98.2  F (36.8  C) 98.1  F (36.7  C)   TempSrc:  Oral Oral Oral   SpO2:  98% 97% 96%   Weight: 66.2 kg (145 lb 14.4 oz)      Height:           A&O, NAD  Breathing non-labored on RA  Appears well perfused  Soft, NT, ND  Distal extremities warm    No air leak, serosang CT output 490 yesterday, 295 day prior    A/P: Trevor Cardenas is a 24 year old male with h/o left spontaneous pneumothorax s/p 6/9 thoracoscopic blebectomy, pleurodesis.     - reg diet  -CT to WS after 48 hours suction, follow-up cxr  - remove CT if follow-up cxr unremarkable  -Multimodal pain control  -Lovenox ppx    POSTOP MEDICAL ISSUES: none     Clinically Significant Risk Factors                                         Pt seen and discussed with fellow and/or chief resident and staff surgeon.     Gil Cason DO MBA  [facility]  PGY-1 Resident, General Surgery

## 2025-06-11 NOTE — PLAN OF CARE
Goal Outcome Evaluation:  Vitals:    06/10/25 0848 06/10/25 1430 06/10/25 2130 25 0617   BP:  122/82 129/82 119/70   BP Location:  Right arm Right arm Right arm   Pulse:  69 72 66   Resp:  18 18 18   Temp:  97.9  F (36.6  C) 98.2  F (36.8  C) 98.1  F (36.7  C)   TempSrc:  Oral Oral Oral   SpO2:  98% 97% 96%   Weight: 66.2 kg (145 lb 14.4 oz)      Height:           Neuro: alert and oriented, pleasant     VS: afebrile vitally stable, sating 96% on room air, lungs clear on right posterior, slight diminished on left          Non labor breathing, chest x-ray is done,    B    Cardiac: 66    Pain/Nausea: denies nausea, better pain management with schedule tylenol and Ibuprofen    Lines/Drains: chest tube -20 to water seal, PIV SL     Incision/Wound: left chest incision site prima pore dry and intact,     GI/: audible BS, passing gas, bowl regiment provided voiding adequate     Diet/Appetite: tolerating regular diet     Activity: up and ambulated independently down the meyer     Plan:  continue with plan of care.

## 2025-06-11 NOTE — DISCHARGE SUMMARY
Thoracic Surgery Discharge Summary    NAME: Trevor Cardenas   MRN: [id number]   : 2000     DATE OF ADMISSION: 2025     PRE/POSTOPERATIVE DIAGNOSES: left primary spontaneous pneumothorax     PROCEDURES PERFORMED: PLEURODESIS, THORACOSCOPIC, Blebectomy, Partial Pleurectomy, Left - Chest     PATHOLOGY RESULTS: In process     CULTURE RESULTS: None     INTRAOPERATIVE COMPLICATIONS: None     POSTOPERATIVE COMPLICATIONS: None     DRAINS/TUBES PRESENT AT DISCHARGE: none    DATE OF DISCHARGE:  25     HOSPITAL COURSE: Trevor Cardenas is a 24 year old male who on 2025  underwent the above-named procedures.  He tolerated the operation well and postoperatively was transferred to the general post-surgical unit.  The remainder of his course was essentially uncomplicated.  Prior to discharge, his pain was controlled well, he was able to perform ADLs and ambulate independently without difficulty, and had full return of bowel and bladder function.  On 25, he was discharged to home in stable condition.    DISCHARGE INSTRUCTIONS:  THORACIC SURGERY DISCHARGE INSTRUCTIONS    DIET: Regular diet as tolerated    If your plans upon discharge include prolonged periods of sitting (i.e a lengthy car or plane ride), it is highly beneficial to get up and walk at least once per hour to help prevent swelling and blood clots.     You may remove chest tube dressing 48 hours after tube removal ( okay to remove) and bandage the site at your own discretion thereafter.  Small amounts of leakage are normal for 2-3 days after removal.  Feel free to call with questions.    You may get incision wet 2 days after operation. Do not submerge, soak, or scrub incision or swim until seen in follow-up.    Take incentive spirometer home for continued frequent use    Activity as tolerated, no strenous activity until seen in follow-up, no lifting greater than 20 pounds for the next 4 weeks.    Stay hydrated. Take over  the counter fiber (metamucil or benefiber) and stool softeners (Miralax, docusate or senna) if becoming constipated.     Call for fever greater than 101.5, chills, increased size of incision, red skin around incision, vision changes, muscle strength changes, sensation changes, shortness of breath, or other concerns.    No driving while taking narcotic pain medication.    Transition to ibuprofen or tylenol/acetaminophen for pain control. Do not take tylenol/acetaminophen and acetaminophen containing narcotic (e.g., percocet or vicodin) at the same time. If you have known ulcer problems, or kidney trouble (elevated creatinine) do not take the ibuprofen.    In emergencies, call 911    For other Questions or Concerns;   A.) During weekday working hours (Monday through Friday 8am to 4:30pm)   call [phone] and ask to speak to a clinical nurse specialist.     B.) At nights (after 4:30pm), on weekends, or if urgent call [phone] and   tell the   I would like to page job code [phone], the thoracic surgery   fellow on call, please.     FOLLOW UP APPOINTMENTS:   7/11/25    DISCHARGE MEDICATIONS:      Review of your medicines        START taking        Dose / Directions   acetaminophen 325 MG tablet  Commonly known as: TYLENOL  Used for: Primary spontaneous pneumothorax      Dose: 975 mg  Take 3 tablets (975 mg) by mouth every 6 hours.  Quantity: 300 tablet  Refills: 0     bisacodyl 10 MG suppository  Commonly known as: DULCOLAX  Used for: Primary spontaneous pneumothorax      Dose: 10 mg  Start taking on: June 12, 2025  Place 1 suppository (10 mg) rectally daily as needed for constipation (Use if magnesium hydroxide (MILK of MAGNESIA) is not effective after 24 hours. May discontinue if patient having bowel movement.).  Quantity: 10 suppository  Refills: 0     hydrOXYzine HCl 10 MG tablet  Commonly known as: ATARAX  Used for: Primary spontaneous pneumothorax      Dose: 30-50 mg  Take 3-5 tablets (30-50 mg)  by mouth every 6 hours as needed for itching, anxiety or other (pain).  Quantity: 15 tablet  Refills: 0     ibuprofen 600 MG tablet  Commonly known as: ADVIL/MOTRIN  Used for: Primary spontaneous pneumothorax      Dose: 600 mg  Take 1 tablet (600 mg) by mouth every 6 hours.  Quantity: 100 tablet  Refills: 0     Lidocaine 4 % Patch  Commonly known as: LIDOCARE  Used for: Primary spontaneous pneumothorax      Dose: 1 patch  Place 1 patch over 12 hours onto the skin every 24 hours. To prevent lidocaine toxicity, patient should be patch free for 12 hrs daily.  Quantity: 10 patch  Refills: 0     magnesium hydroxide 400 MG/5ML suspension  Commonly known as: MILK OF MAGNESIA  Used for: Primary spontaneous pneumothorax      Dose: 30 mL  Take 30 mLs by mouth daily as needed for constipation (Use if polyethylene glycol (Miralax) is not effective after 24 hours.).  Quantity: 354 mL  Refills: 0     Methocarbamol 1000 MG Tabs  Used for: Primary spontaneous pneumothorax      Dose: 1,000 mg  Take 1,000 mg by mouth 3 times daily for 15 days.  Quantity: 45 tablet  Refills: 0     ondansetron 4 MG ODT tab  Commonly known as: ZOFRAN ODT  Used for: Primary spontaneous pneumothorax      Dose: 4 mg  Take 1 tablet (4 mg) by mouth every 6 hours as needed for nausea or vomiting.  Quantity: 20 tablet  Refills: 0     oxyCODONE 5 MG tablet  Commonly known as: ROXICODONE  Used for: Primary spontaneous pneumothorax      Dose: 5 mg  Take 1 tablet (5 mg) by mouth every 4 hours as needed for moderate pain.  Quantity: 12 tablet  Refills: 0     polyethylene glycol 17 GM/Dose powder  Commonly known as: MIRALAX  Used for: Primary spontaneous pneumothorax      Dose: 17 g  Take 17 g by mouth daily.  Quantity: 510 g  Refills: 0     senna-docusate 8.6-50 MG tablet  Commonly known as: SENOKOT-S/PERICOLACE  Used for: Primary spontaneous pneumothorax      Dose: 1 tablet  Take 1 tablet by mouth 2 times daily.  Quantity: 30 tablet  Refills: 0               Where  to get your medicines        These medications were sent to [facility] - [place], [place] - [address]  [address], [facility] [address]      Phone: [phone]   acetaminophen 325 MG tablet  bisacodyl 10 MG suppository  hydrOXYzine HCl 10 MG tablet  ibuprofen 600 MG tablet  Lidocaine 4 % Patch  magnesium hydroxide 400 MG/5ML suspension  Methocarbamol 1000 MG Tabs  ondansetron 4 MG ODT tab  polyethylene glycol 17 GM/Dose powder  senna-docusate 8.6-50 MG tablet       Some of these will need a paper prescription and others can be bought over the counter. Ask your nurse if you have questions.    Bring a paper prescription for each of these medications  oxyCODONE 5 MG tablet           *MEDICATIONS INTENDED TO BE THE SAME AS PRIOR TO ADMISSION AND ADDED PAIN MEDICATION AND STOOL SOFTENERS     Pt seen and discussed with fellow and/or chief resident and staff surgeon.     Gil Cason DO MBA  [facility]  PGY-1 Resident, General Surgery

## 2025-06-11 NOTE — DISCHARGE INSTRUCTIONS
THORACIC SURGERY DISCHARGE INSTRUCTIONS    DIET: Regular diet as tolerated    If your plans upon discharge include prolonged periods of sitting (i.e a lengthy car or plane ride), it is highly beneficial to get up and walk at least once per hour to help prevent swelling and blood clots.     You may remove chest tube dressing 48 hours after tube removal (6/13 okay to remove) and bandage the site at your own discretion thereafter.  Small amounts of leakage are normal for 2-3 days after removal.  Feel free to call with questions.    You may get incision wet 2 days after operation. Do not submerge, soak, or scrub incision or swim until seen in follow-up.    Take incentive spirometer home for continued frequent use    Activity as tolerated, no strenous activity until seen in follow-up, no lifting greater than 20 pounds for the next 4 weeks.    Stay hydrated. Take over the counter fiber (metamucil or benefiber) and stool softeners (Miralax, docusate or senna) if becoming constipated.     Call for fever greater than 101.5, chills, increased size of incision, red skin around incision, vision changes, muscle strength changes, sensation changes, shortness of breath, or other concerns.    No driving while taking narcotic pain medication.    Transition to ibuprofen or tylenol/acetaminophen for pain control. Do not take tylenol/acetaminophen and acetaminophen containing narcotic (e.g., percocet or vicodin) at the same time. If you have known ulcer problems, or kidney trouble (elevated creatinine) do not take the ibuprofen.    In emergencies, call 911    For other Questions or Concerns;   A.) During weekday working hours (Monday through Friday 8am to 4:30pm)   call [phone] and ask to speak to a clinical nurse specialist.     B.) At nights (after 4:30pm), on weekends, or if urgent call [phone] and   tell the   I would like to page job code [phone], the thoracic surgery   fellow on call, please.

## 2025-06-11 NOTE — PLAN OF CARE
"/70 (BP Location: Right arm)   Pulse 66   Temp 98.1  F (36.7  C) (Oral)   Resp 18   Ht 1.854 m (6' 1\")   Wt 66.2 kg (145 lb 14.4 oz)   SpO2 96%   BMI 19.25 kg/m        Outcome Evaluation: A&O x 4. Reports no pain at rest but pain 3-10 at chest tube site and incision with activity. Gave scheduled tylenol and ibuprofen. Independent in room. CT to -20 cm H2O suction- dark red OP. Right chest surgical incision-CDI dressing. PIV SL.      Goal Outcome Evaluation: Ongoing       Plan of Care Reviewed With: patient    Overall Patient Progress: no change                "

## 2025-06-12 ENCOUNTER — APPOINTMENT (OUTPATIENT)
Dept: GENERAL RADIOLOGY | Facility: CLINIC | Age: 25
End: 2025-06-12
Payer: COMMERCIAL

## 2025-06-12 VITALS
DIASTOLIC BLOOD PRESSURE: 73 MMHG | HEART RATE: 78 BPM | WEIGHT: 146 LBS | OXYGEN SATURATION: 97 % | BODY MASS INDEX: 19.35 KG/M2 | SYSTOLIC BLOOD PRESSURE: 106 MMHG | RESPIRATION RATE: 16 BRPM | TEMPERATURE: 98.7 F | HEIGHT: 73 IN

## 2025-06-12 LAB
CREAT SERPL-MCNC: 1.07 MG/DL (ref 0.67–1.17)
EGFRCR SERPLBLD CKD-EPI 2021: >90 ML/MIN/1.73M2
MCV RBC AUTO: 87 FL (ref 78–100)
PLATELET # BLD AUTO: 241 10E3/UL (ref 150–450)

## 2025-06-12 PROCEDURE — 71045 X-RAY EXAM CHEST 1 VIEW: CPT | Mod: 26 | Performed by: RADIOLOGY

## 2025-06-12 PROCEDURE — 250N000013 HC RX MED GY IP 250 OP 250 PS 637

## 2025-06-12 PROCEDURE — 3E0L3GC INTRODUCTION OF OTHER THERAPEUTIC SUBSTANCE INTO PLEURAL CAVITY, PERCUTANEOUS APPROACH: ICD-10-PCS | Performed by: PHYSICIAN ASSISTANT

## 2025-06-12 PROCEDURE — 250N000009 HC RX 250: Performed by: PHYSICIAN ASSISTANT

## 2025-06-12 PROCEDURE — 32551 INSERTION OF CHEST TUBE: CPT | Mod: 78 | Performed by: PHYSICIAN ASSISTANT

## 2025-06-12 PROCEDURE — 999N000065 XR CHEST PORT 1 VIEW

## 2025-06-12 PROCEDURE — 250N000011 HC RX IP 250 OP 636: Mod: JZ

## 2025-06-12 PROCEDURE — 85049 AUTOMATED PLATELET COUNT: CPT

## 2025-06-12 PROCEDURE — 258N000003 HC RX IP 258 OP 636: Performed by: PHYSICIAN ASSISTANT

## 2025-06-12 PROCEDURE — 258N000003 HC RX IP 258 OP 636

## 2025-06-12 PROCEDURE — 0W9B30Z DRAINAGE OF LEFT PLEURAL CAVITY WITH DRAINAGE DEVICE, PERCUTANEOUS APPROACH: ICD-10-PCS | Performed by: PHYSICIAN ASSISTANT

## 2025-06-12 PROCEDURE — 32560 TREAT PLEURODESIS W/AGENT: CPT | Mod: XE | Performed by: PHYSICIAN ASSISTANT

## 2025-06-12 PROCEDURE — 250N000009 HC RX 250

## 2025-06-12 PROCEDURE — 82565 ASSAY OF CREATININE: CPT

## 2025-06-12 PROCEDURE — 250N000011 HC RX IP 250 OP 636

## 2025-06-12 PROCEDURE — 71045 X-RAY EXAM CHEST 1 VIEW: CPT

## 2025-06-12 PROCEDURE — 36415 COLL VENOUS BLD VENIPUNCTURE: CPT

## 2025-06-12 PROCEDURE — 120N000002 HC R&B MED SURG/OB UMMC

## 2025-06-12 RX ORDER — HYDROXYZINE HYDROCHLORIDE 25 MG/1
25 TABLET, FILM COATED ORAL EVERY 6 HOURS PRN
Status: DISCONTINUED | OUTPATIENT
Start: 2025-06-12 | End: 2025-06-15 | Stop reason: HOSPADM

## 2025-06-12 RX ORDER — SODIUM CHLORIDE 9 MG/ML
INJECTION, SOLUTION INTRAVENOUS CONTINUOUS
Status: DISCONTINUED | OUTPATIENT
Start: 2025-06-12 | End: 2025-06-15

## 2025-06-12 RX ADMIN — IBUPROFEN 600 MG: 600 TABLET, FILM COATED ORAL at 11:31

## 2025-06-12 RX ADMIN — METHOCARBAMOL 1000 MG: 500 TABLET ORAL at 08:18

## 2025-06-12 RX ADMIN — IBUPROFEN 600 MG: 600 TABLET, FILM COATED ORAL at 22:55

## 2025-06-12 RX ADMIN — HYDROMORPHONE HYDROCHLORIDE 0.4 MG: 0.2 INJECTION, SOLUTION INTRAMUSCULAR; INTRAVENOUS; SUBCUTANEOUS at 11:30

## 2025-06-12 RX ADMIN — IBUPROFEN 600 MG: 600 TABLET, FILM COATED ORAL at 16:25

## 2025-06-12 RX ADMIN — POLYETHYLENE GLYCOL 3350 17 G: 17 POWDER, FOR SOLUTION ORAL at 08:18

## 2025-06-12 RX ADMIN — IBUPROFEN 600 MG: 600 TABLET, FILM COATED ORAL at 00:07

## 2025-06-12 RX ADMIN — METHOCARBAMOL 1000 MG: 500 TABLET ORAL at 20:05

## 2025-06-12 RX ADMIN — ACETAMINOPHEN 975 MG: 325 TABLET, FILM COATED ORAL at 20:05

## 2025-06-12 RX ADMIN — LIDOCAINE 1 PATCH: 4 PATCH TOPICAL at 08:18

## 2025-06-12 RX ADMIN — ENOXAPARIN SODIUM 40 MG: 40 INJECTION SUBCUTANEOUS at 08:18

## 2025-06-12 RX ADMIN — ACETAMINOPHEN 975 MG: 325 TABLET, FILM COATED ORAL at 08:18

## 2025-06-12 RX ADMIN — FAMOTIDINE 20 MG: 20 TABLET, FILM COATED ORAL at 20:05

## 2025-06-12 RX ADMIN — FAMOTIDINE 20 MG: 20 TABLET, FILM COATED ORAL at 08:18

## 2025-06-12 RX ADMIN — IBUPROFEN 600 MG: 600 TABLET, FILM COATED ORAL at 06:31

## 2025-06-12 RX ADMIN — LIDOCAINE HYDROCHLORIDE 30 ML: 10 INJECTION, SOLUTION EPIDURAL; INFILTRATION; INTRACAUDAL; PERINEURAL at 15:30

## 2025-06-12 RX ADMIN — ACETAMINOPHEN 975 MG: 325 TABLET, FILM COATED ORAL at 14:34

## 2025-06-12 RX ADMIN — SENNOSIDES AND DOCUSATE SODIUM 1 TABLET: 50; 8.6 TABLET ORAL at 20:05

## 2025-06-12 RX ADMIN — METHOCARBAMOL 1000 MG: 500 TABLET ORAL at 14:33

## 2025-06-12 RX ADMIN — SODIUM CHLORIDE: 0.9 INJECTION, SOLUTION INTRAVENOUS at 14:34

## 2025-06-12 RX ADMIN — SENNOSIDES AND DOCUSATE SODIUM 1 TABLET: 50; 8.6 TABLET ORAL at 08:18

## 2025-06-12 RX ADMIN — TALC 4 G: 4 POWDER INTRAPLEURAL at 16:27

## 2025-06-12 RX ADMIN — Medication: at 14:29

## 2025-06-12 RX ADMIN — ACETAMINOPHEN 975 MG: 325 TABLET, FILM COATED ORAL at 02:33

## 2025-06-12 RX ADMIN — LIDOCAINE HYDROCHLORIDE 30 ML: 10 INJECTION, SOLUTION EPIDURAL; INFILTRATION; INTRACAUDAL; PERINEURAL at 11:32

## 2025-06-12 ASSESSMENT — ACTIVITIES OF DAILY LIVING (ADL)
ADLS_ACUITY_SCORE: 33
ADLS_ACUITY_SCORE: 33
ADLS_ACUITY_SCORE: 35
ADLS_ACUITY_SCORE: 33
ADLS_ACUITY_SCORE: 35
ADLS_ACUITY_SCORE: 33

## 2025-06-12 NOTE — PLAN OF CARE
"/75 (BP Location: Left arm)   Pulse 68   Temp 98.4  F (36.9  C) (Oral)   Resp 16   Ht 1.854 m (6' 1\")   Wt 66.2 kg (145 lb 14.4 oz)   SpO2 98%   BMI 19.25 kg/m          Outcome Evaluation: A&O x4. Reports left chest pain- incisional 3-4/10, gave scheduled tylenol and ibuprofen. Independent in room. Previous chest tube site- with large OP- provider updated- dressing reinforced. Denies SOB and on RA. Passing flatus. IS encouraged- 3000 mL on IS with frequent use. No BM this shift. Regular diet. Parents at bedside during waking hours.      Goal Outcome Evaluation: Ongoing      Plan of Care Reviewed With: patient    Overall Patient Progress: no change        Problem: Adult Inpatient Plan of Care  Goal: Absence of Hospital-Acquired Illness or Injury  Intervention: Identify and Manage Fall Risk  Recent Flowsheet Documentation  Taken 6/11/2025 2018 by Marianela Crabtree RN  Safety Promotion/Fall Prevention:   assistive device/personal items within reach   clutter free environment maintained   lighting adjusted   room near nurse's station   room organization consistent   safety round/check completed   nonskid shoes/slippers when out of bed     Problem: Adult Inpatient Plan of Care  Goal: Absence of Hospital-Acquired Illness or Injury  Intervention: Prevent Skin Injury  Recent Flowsheet Documentation  Taken 6/11/2025 2017 by Marianela Crabtree RN  Body Position: position changed independently     Problem: Adult Inpatient Plan of Care  Goal: Absence of Hospital-Acquired Illness or Injury  Intervention: Prevent and Manage VTE (Venous Thromboembolism) Risk  Recent Flowsheet Documentation  Taken 6/11/2025 2018 by Marianela Crabtree, RN  VTE Prevention/Management: SCDs off (sequential compression devices)     Problem: Adult Inpatient Plan of Care  Goal: Absence of Hospital-Acquired Illness or Injury  Intervention: Prevent Infection  Recent Flowsheet Documentation  Taken 6/11/2025 2018 by Marianela Crabtree, RN  Infection " Prevention:   cohorting utilized   single patient room provided   rest/sleep promoted     Problem: Adult Inpatient Plan of Care  Goal: Optimal Comfort and Wellbeing  Intervention: Monitor Pain and Promote Comfort  Recent Flowsheet Documentation  Taken 6/11/2025 2017 by Marianela Crabtree, RN  Pain Management Interventions: medication (see MAR)     Problem: Surgery Nonspecified  Goal: Anesthesia/Sedation Recovery  Intervention: Optimize Anesthesia Recovery  Recent Flowsheet Documentation  Taken 6/11/2025 2018 by Marianela Crabtree, RN  Safety Promotion/Fall Prevention:   assistive device/personal items within reach   clutter free environment maintained   lighting adjusted   room near nurse's station   room organization consistent   safety round/check completed   nonskid shoes/slippers when out of bed  Administration (IS): proper technique demonstrated  Level Incentive Spirometer (mL): 3000     Problem: Surgery Nonspecified  Goal: Effective Oxygenation and Ventilation  Intervention: Optimize Oxygenation and Ventilation  Recent Flowsheet Documentation  Taken 6/11/2025 2018 by Marianela Crabtree, RN  Cough And Deep Breathing: done independently per patient  Taken 6/11/2025 2017 by Marianela Crabtree, RN  Activity Management: up ad luis  Head of Bed (HOB) Positioning: HOB at 30-45 degrees

## 2025-06-12 NOTE — PLAN OF CARE
Goal Outcome Evaluation:  Vitals:    06/11/25 2237 06/12/25 0556 06/12/25 0816 06/12/25 1359   BP: 121/75 116/70  118/71   BP Location: Left arm Right arm  Left arm   Pulse: 68 79  92   Resp: 16 18 18   Temp: 98.4  F (36.9  C) 98.2  F (36.8  C)  98.4  F (36.9  C)   TempSrc: Oral Oral  Oral   SpO2: 98% 97%  97%   Weight:   66.2 kg (146 lb)    Height:         Neuro: alert and oriented, pleasant      VS: afebrile vitally stable, sating 96% on room air, lungs clear on right posterior, slight diminished on left          Non labor breathing, chest x-ray is done, increased pneumothorax noted         Bed side chest tube placed to water seal,      Cardiac: 92     Pain/Nausea: denies nausea, well pain control with PCA dilaudid 0.2 Q 10, no basal rate, schedule tylenol and Ibuprofen as well      Lines/Drains: chest tube -20 to water seal, PIV TKO     Incision/Wound: left chest incision site prima pore dry and intact,      GI/: audible BS, passing gas, bowl regiment provided voiding adequate      Diet/Appetite: tolerating regular diet      Activity: up and ambulated independently down the meyer      Plan:  chest tube unclamp at 1830, doing much better,   continue with plan of care.

## 2025-06-12 NOTE — PROCEDURES
Thoracic Surgery Procedure Note  Preprocedure Diagnosis:  Persistent Left pneumothorax s/p blebectomy  Postprocedure Diagnosis: Same  Procedure: Bedside chemical pleurodesis (talc)  Medications administered: dPCA set up prior to procedure    A pain control plan was arranged prior to the procedure.  After informed consent was obtained, 30ml of 1% lidocaine without epinephrine was injected into the existing left sided pleural tube.  After allowing adequate time to ensure maximal anaesthesia, the talc slurry (4g talc in 150cc of normal saline) was instilled into the tube, which was clamped distally to encourage movement of the slurry into the pleural space.  After the entire 150cc was instilled, the tube remained clamped, with nursing instructions to unclamp the tube and place it to suction in 2 hours (@1830). The tube will remain to continuous suction for 48-72 hours, after which time thoracic surgery will reevaluate the possibility of removal.    The patient tolerated the procedure well, and there were no complications.     Dr. López was available for the entirety of the procedure.    Mayito Mccauley PA-C  Thoracic and Foregut Surgery    Securely message with [employer]   Text page via [facility] Paging/Directory

## 2025-06-12 NOTE — PROCEDURES
Thoracic Surgery Procedure Note    Preprocedure Diagnosis: Left pneumothorax  Postprocedure Diagnosis: same as above    Procedure: Left sided chest tube placement .  (14F in 5th intercostal space at mid axillary line.)     Premed: 0.2mg Dilaudid  Additional Meds: Local anesthetic      Trevor Cardenas was prepped and draped in the usual fashion. Lidocaine was locally injected into the subcutaneous tissue as well as periostium and the pleural space. An 18 gauge needle with syringe attached was inserted into the pleural space with aspiration of air to verify placement. A guide wire was advanced into the pleural space and the needle was withdrawn. A small incision was made through the skin and the subcutaneous tissues were dilated. The chest drain was inserted into the pleural space. The tube was sutured in place and dressing applied. The patient tolerated the procedure well, and there were no complications.      A STAT CXR was ordered to confirm proper placement.     Mayito Mccauley PA-C  Thoracic and Foregut Surgery    Securely message with [employer]   Text page via [facility] Paging/Directory

## 2025-06-12 NOTE — PROVIDER NOTIFICATION
[id number] Trevor Cardenas,    Pt. left chest tube dressing dressing is soaked. Him and family would like to have someone from the team change  dressing and discuss the x-ray results.     Thanks,     Marianela Crabtree RN       Paged Thoracic

## 2025-06-12 NOTE — PROGRESS NOTES
THORACIC & FOREGUT SURGERY    S:  Pt with post-pull pneumothorax yesterday after tube removal. Dressing replaced and subsequent imaging showed stable but unresolved ptx. He is asymptomatic.     O:  Vitals:    06/11/25 1545 06/11/25 2237 06/12/25 0556 06/12/25 0816   BP: (!) 141/82 121/75 116/70    BP Location: Left arm Left arm Right arm    Pulse: 103 68 79    Resp: 18 16 18    Temp: 98.7  F (37.1  C) 98.4  F (36.9  C) 98.2  F (36.8  C)    TempSrc: Oral Oral Oral    SpO2:  98% 97%    Weight:    66.2 kg (146 lb)   Height:           A&O, NAD  Breathing non-labored on RA  Appears well perfused  Soft, NT, ND  Distal extremities warm      Imaging reviewed    A/P: Trevor Cardenas is a 24 year old male with h/o left spontaneous pneumothorax s/p 6/9 thoracoscopic blebectomy, pleurodesis. Pleural tube was removed 6/11 with post-pull moderate sized pneumothorax, unresolved despite pulmonary hygiene and IS.     - New bedside pleural tube placed today (see procedure note)  -Pleural tube to suction -20  -CXR to follow, pending results will likely plan for bedside talc pleurodesis this afternoon  -Multimodal pain control  -Lovenox ppx    Clinically Significant Risk Factors                                       Pt seen and discussed with staff    Mayito Mccauley PA-C  Thoracic and Foregut Surgery    Securely message with [employer]   Text page via [employer] Paging/Directory

## 2025-06-13 ENCOUNTER — APPOINTMENT (OUTPATIENT)
Dept: GENERAL RADIOLOGY | Facility: CLINIC | Age: 25
End: 2025-06-13
Payer: COMMERCIAL

## 2025-06-13 PROCEDURE — 250N000011 HC RX IP 250 OP 636

## 2025-06-13 PROCEDURE — 250N000013 HC RX MED GY IP 250 OP 250 PS 637

## 2025-06-13 PROCEDURE — 120N000002 HC R&B MED SURG/OB UMMC

## 2025-06-13 PROCEDURE — 71045 X-RAY EXAM CHEST 1 VIEW: CPT | Mod: 26 | Performed by: RADIOLOGY

## 2025-06-13 PROCEDURE — 71045 X-RAY EXAM CHEST 1 VIEW: CPT

## 2025-06-13 RX ADMIN — POLYETHYLENE GLYCOL 3350 17 G: 17 POWDER, FOR SOLUTION ORAL at 08:15

## 2025-06-13 RX ADMIN — FAMOTIDINE 20 MG: 20 TABLET, FILM COATED ORAL at 20:23

## 2025-06-13 RX ADMIN — ACETAMINOPHEN 975 MG: 325 TABLET, FILM COATED ORAL at 13:24

## 2025-06-13 RX ADMIN — METHOCARBAMOL 1000 MG: 500 TABLET ORAL at 08:16

## 2025-06-13 RX ADMIN — ACETAMINOPHEN 975 MG: 325 TABLET, FILM COATED ORAL at 03:23

## 2025-06-13 RX ADMIN — ENOXAPARIN SODIUM 40 MG: 40 INJECTION SUBCUTANEOUS at 08:15

## 2025-06-13 RX ADMIN — ACETAMINOPHEN 975 MG: 325 TABLET, FILM COATED ORAL at 08:16

## 2025-06-13 RX ADMIN — SENNOSIDES AND DOCUSATE SODIUM 1 TABLET: 50; 8.6 TABLET ORAL at 20:23

## 2025-06-13 RX ADMIN — FAMOTIDINE 20 MG: 20 TABLET, FILM COATED ORAL at 08:17

## 2025-06-13 RX ADMIN — METHOCARBAMOL 1000 MG: 500 TABLET ORAL at 20:23

## 2025-06-13 RX ADMIN — IBUPROFEN 600 MG: 600 TABLET, FILM COATED ORAL at 06:03

## 2025-06-13 RX ADMIN — IBUPROFEN 600 MG: 600 TABLET, FILM COATED ORAL at 11:07

## 2025-06-13 RX ADMIN — IBUPROFEN 600 MG: 600 TABLET, FILM COATED ORAL at 17:20

## 2025-06-13 RX ADMIN — SENNOSIDES AND DOCUSATE SODIUM 1 TABLET: 50; 8.6 TABLET ORAL at 08:17

## 2025-06-13 RX ADMIN — METHOCARBAMOL 1000 MG: 500 TABLET ORAL at 13:24

## 2025-06-13 RX ADMIN — ACETAMINOPHEN 975 MG: 325 TABLET, FILM COATED ORAL at 20:23

## 2025-06-13 ASSESSMENT — ACTIVITIES OF DAILY LIVING (ADL)
ADLS_ACUITY_SCORE: 35
ADLS_ACUITY_SCORE: 36
ADLS_ACUITY_SCORE: 36
ADLS_ACUITY_SCORE: 35
ADLS_ACUITY_SCORE: 35
ADLS_ACUITY_SCORE: 36
ADLS_ACUITY_SCORE: 36
ADLS_ACUITY_SCORE: 35
ADLS_ACUITY_SCORE: 36
ADLS_ACUITY_SCORE: 36
ADLS_ACUITY_SCORE: 35
ADLS_ACUITY_SCORE: 35
ADLS_ACUITY_SCORE: 36

## 2025-06-13 NOTE — PLAN OF CARE
"/73 (BP Location: Left arm)   Pulse 78   Temp 98.7  F (37.1  C) (Oral)   Resp 16   Ht 1.854 m (6' 1\")   Wt 66.2 kg (146 lb)   SpO2 97%   BMI 19.26 kg/m        Outcome Evaluation: A&O x 4. Reports left chest tube site pain 3-5/10, PCA dialudid in use. Gave scheduled tylenol, ibuprofen and robaxin. Regular diet. Intermitent nausea with PCA dilaudid administration-offered antiemitcs- patient refused but did want ginger ale. Independent. Mother and brother at bedside during waking hours. Left chest tube -20 cm H2O- no air leak, dark red OP. Previous left chest tube site and left chest surgical incision. Voiding. Per patient report x 2 BM's 6/12/2025.      Goal Outcome Evaluation: Ongoing       Plan of Care Reviewed With: patient    Overall Patient Progress: no change          Problem: Adult Inpatient Plan of Care  Goal: Absence of Hospital-Acquired Illness or Injury  Intervention: Prevent Skin Injury  Recent Flowsheet Documentation  Taken 6/12/2025 2007 by Marianela Crabtree RN  Body Position: position changed independently     Problem: Adult Inpatient Plan of Care  Goal: Absence of Hospital-Acquired Illness or Injury  Intervention: Prevent and Manage VTE (Venous Thromboembolism) Risk  Recent Flowsheet Documentation  Taken 6/12/2025 2007 by Marianela Crabtree RN  VTE Prevention/Management: SCDs off (sequential compression devices)     Problem: Adult Inpatient Plan of Care  Goal: Absence of Hospital-Acquired Illness or Injury  Intervention: Prevent Skin Injury  Recent Flowsheet Documentation  Taken 6/12/2025 2007 by Marianela Crabtree RN  Body Position: position changed independently     Problem: Adult Inpatient Plan of Care  Goal: Optimal Comfort and Wellbeing  Intervention: Monitor Pain and Promote Comfort  Recent Flowsheet Documentation  Taken 6/12/2025 2007 by Marianela Crabtree, RN  Pain Management Interventions: pain pump in use     Problem: Adult Inpatient Plan of Care  Goal: Absence of Hospital-Acquired Illness " or Injury  Intervention: Prevent Infection  Recent Flowsheet Documentation  Taken 6/12/2025 2007 by Marianela Crabtree RN  Infection Prevention:   cohorting utilized   single patient room provided   rest/sleep promoted     Problem: Pain Acute  Goal: Optimal Pain Control and Function  Intervention: Prevent or Manage Pain  Recent Flowsheet Documentation  Taken 6/12/2025 2007 by Marianela Crabtree RN  Sensory Stimulation Regulation:   care clustered   lighting decreased  Bowel Elimination Promotion:   adequate fluid intake promoted   ambulation promoted  Medication Review/Management: medications reviewed     Problem: Surgery Nonspecified  Goal: Nausea and Vomiting Relief  Intervention: Prevent or Manage Nausea and Vomiting  Recent Flowsheet Documentation  Taken 6/12/2025 2007 by Marianela Crabtree RN  Nausea/Vomiting Interventions: (Denies nausea at this moment- declined antiemetic) other (see comments)                Initial (On Arrival)

## 2025-06-13 NOTE — PLAN OF CARE
Goal Outcome Evaluation: Ongoing    Plan of Care Reviewed With: Patient    Overall Patient Progress: No Change              Vitals: B/P: 106/65, T: 98.1, P: 77, R: 18     Pt A&O x 4, VSS on RA. Pt voiding adequately and reported x1 BM yesterday. Pain being managed with PCA dilaudid and scheduled tylenol, ibuprofen, and robaxin. 1 L CT -40 to water seal with dark red output. Denies nausea, SOB, chest pain. Ambulating independently with standby assist. Regular Diet. Continue with POC.

## 2025-06-13 NOTE — PROGRESS NOTES
"THORACIC & FOREGUT SURGERY    S:  No acute events overnight.  Generally doing well.  No shortness of breath.  Pain controlled.    O:  Vitals:    06/12/25 0816 06/12/25 1359 06/12/25 2237 06/13/25 0615   BP:  118/71 106/73 112/76   BP Location:  Left arm Left arm Left arm   Pulse:  92 78 82   Resp:  18 16 18   Temp:  98.4  F (36.9  C) 98.7  F (37.1  C) 98.2  F (36.8  C)   TempSrc:  Oral Oral Oral   SpO2:  97% 97% 98%   Weight: 66.2 kg (146 lb)      Height:           A&O, NAD  Breathing non-labored on RA  Appears well perfused  Soft, NT, ND  Distal extremities warm    Left pigtail chest tube in place, thin serosanguineous drainage, tidaling      Imaging reviewed, stable to mildly improved left pneumothorax    A/P: Trevor Cardenas is a 24 year old male with h/o left spontaneous pneumothorax s/p 6/9 thoracoscopic blebectomy, pleurodesis. Pleural tube was removed 6/11 with post-pull moderate sized pneumothorax, likely secondary to nonocclusive dressing placed after leakage from chest tube site.    -Continue left-sided pigtail chest tube to suction -40  -CXR tomorrow a.m.  -Will likely put chest tube to waterseal tomorrow a.m. if imaging stable  -Possible dispo in 48 hours  -Multimodal pain control  -Lovenox ppx    Clinically Significant Risk Factors                                       Pt seen and discussed with staff    Aldo Chuo PA-C  Thoracic & Foregut Surgery    To page Thoracic Surgery team, click here: [facility]   Choose \"On Call\" tab at top, then use the search box for \"Thoracic\", then select the \"THORACIC SURGERY /[facility]\"             "

## 2025-06-14 ENCOUNTER — APPOINTMENT (OUTPATIENT)
Dept: GENERAL RADIOLOGY | Facility: CLINIC | Age: 25
End: 2025-06-14
Payer: COMMERCIAL

## 2025-06-14 ENCOUNTER — APPOINTMENT (OUTPATIENT)
Dept: GENERAL RADIOLOGY | Facility: CLINIC | Age: 25
End: 2025-06-14
Attending: STUDENT IN AN ORGANIZED HEALTH CARE EDUCATION/TRAINING PROGRAM
Payer: COMMERCIAL

## 2025-06-14 PROCEDURE — 71045 X-RAY EXAM CHEST 1 VIEW: CPT | Mod: 26 | Performed by: RADIOLOGY

## 2025-06-14 PROCEDURE — 250N000013 HC RX MED GY IP 250 OP 250 PS 637

## 2025-06-14 PROCEDURE — 120N000002 HC R&B MED SURG/OB UMMC

## 2025-06-14 PROCEDURE — 250N000011 HC RX IP 250 OP 636

## 2025-06-14 PROCEDURE — 71045 X-RAY EXAM CHEST 1 VIEW: CPT | Mod: 77

## 2025-06-14 PROCEDURE — 71045 X-RAY EXAM CHEST 1 VIEW: CPT

## 2025-06-14 PROCEDURE — 258N000003 HC RX IP 258 OP 636

## 2025-06-14 RX ADMIN — IBUPROFEN 600 MG: 600 TABLET, FILM COATED ORAL at 17:28

## 2025-06-14 RX ADMIN — IBUPROFEN 600 MG: 600 TABLET, FILM COATED ORAL at 23:47

## 2025-06-14 RX ADMIN — ACETAMINOPHEN 975 MG: 325 TABLET, FILM COATED ORAL at 21:33

## 2025-06-14 RX ADMIN — ENOXAPARIN SODIUM 40 MG: 40 INJECTION SUBCUTANEOUS at 08:36

## 2025-06-14 RX ADMIN — FAMOTIDINE 20 MG: 20 TABLET, FILM COATED ORAL at 19:42

## 2025-06-14 RX ADMIN — IBUPROFEN 600 MG: 600 TABLET, FILM COATED ORAL at 11:34

## 2025-06-14 RX ADMIN — IBUPROFEN 600 MG: 600 TABLET, FILM COATED ORAL at 06:18

## 2025-06-14 RX ADMIN — ACETAMINOPHEN 975 MG: 325 TABLET, FILM COATED ORAL at 14:05

## 2025-06-14 RX ADMIN — ACETAMINOPHEN 975 MG: 325 TABLET, FILM COATED ORAL at 08:35

## 2025-06-14 RX ADMIN — METHOCARBAMOL 1000 MG: 500 TABLET ORAL at 14:05

## 2025-06-14 RX ADMIN — POLYETHYLENE GLYCOL 3350 17 G: 17 POWDER, FOR SOLUTION ORAL at 08:36

## 2025-06-14 RX ADMIN — FAMOTIDINE 20 MG: 20 TABLET, FILM COATED ORAL at 08:36

## 2025-06-14 RX ADMIN — ACETAMINOPHEN 975 MG: 325 TABLET, FILM COATED ORAL at 02:58

## 2025-06-14 RX ADMIN — SODIUM CHLORIDE: 0.9 INJECTION, SOLUTION INTRAVENOUS at 15:22

## 2025-06-14 RX ADMIN — METHOCARBAMOL 1000 MG: 500 TABLET ORAL at 08:36

## 2025-06-14 RX ADMIN — SENNOSIDES AND DOCUSATE SODIUM 1 TABLET: 50; 8.6 TABLET ORAL at 19:42

## 2025-06-14 RX ADMIN — IBUPROFEN 600 MG: 600 TABLET, FILM COATED ORAL at 00:35

## 2025-06-14 RX ADMIN — LIDOCAINE 1 PATCH: 4 PATCH TOPICAL at 08:35

## 2025-06-14 RX ADMIN — SENNOSIDES AND DOCUSATE SODIUM 1 TABLET: 50; 8.6 TABLET ORAL at 08:36

## 2025-06-14 RX ADMIN — METHOCARBAMOL 1000 MG: 500 TABLET ORAL at 19:42

## 2025-06-14 NOTE — PLAN OF CARE
Goal Outcome Evaluation:      Plan of Care Reviewed With: patient, parent    Overall Patient Progress: no changeOverall Patient Progress: no change  6/9: Thoracoscopic Blebectomy, Pleurodesis.   Neuro: AOx4, able to make need to known  Cardiac: WDL, denies chest pain  Respiratory: on RA, denies SOB  GI/: voiding adequately, passing flatus, no BM  Diet/Appetite: regular, denies N/V  Skin: left chest incision to primapore  LDA: (R) PIV infusing PCA dilaudid, left chest tube to -40 to wall suction , no air eak  Activity: up ad luis  Pain: Left incision site managed with PCA dilaudid 0.2 mg q 10, no basal rate, schedule Tylenol & Ibuprofen  Plan: CXR in AM, pain management, continue POC

## 2025-06-14 NOTE — PLAN OF CARE
"Blood pressure 112/60, pulse 75, temperature 97.7  F (36.5  C), temperature source Axillary, resp. rate 18, height 1.854 m (6' 1\"), weight 67.1 kg (148 lb), SpO2 98%.  Goal Outcome Evaluation:  Pt. is A&Ox4 up independently,left chest tube site pain 3-4/10 controled with PCA dilaudid ,which pt. used only one time ,scheduled tylenol, ibuprofen and robaxin also given.LS diminished at left lower lobe,denied SOB and NATARAJAN  left chest tube to water seal, no water leak.pt. had 90 ml's dark red drainage.family at bedside supportive with cares.Will continue to monitor.                   "

## 2025-06-14 NOTE — PROGRESS NOTES
THORACIC & FOREGUT SURGERY    S:  No acute events overnight.  Generally doing well.  No shortness of breath.  Pain controlled.    O:  Vitals:    06/13/25 1315 06/13/25 1355 06/13/25 2228 06/14/25 0512   BP:  106/65 106/60 111/67   BP Location:  Left arm Left arm Left arm   Pulse:  77 79 75   Resp:  18 17 18   Temp:  98.1  F (36.7  C) 98.2  F (36.8  C) 98  F (36.7  C)   TempSrc:  Oral Oral Oral   SpO2:  96% 98% 98%   Weight: 67.1 kg (148 lb)      Height:           A&O, NAD  Breathing non-labored on RA  Appears well perfused  Soft, NT, ND  Distal extremities warm    Left pigtail chest tube in place, thin serosanguineous drainage, tidaling    A/P: Trevor Cardenas is a 24 year old male with h/o left spontaneous pneumothorax s/p 6/9 thoracoscopic blebectomy, pleurodesis. Pleural tube was removed 6/11 with post-pull moderate sized pneumothorax, likely secondary to nonocclusive dressing placed after leakage from chest tube site.    -follow-up cxr, if stable or improved, place to  from -40 suction, cxr 2 hours after  -CXR tomorrow a.m.  -Multimodal pain control  - reg diet  -Lovenox ppx    Clinically Significant Risk Factors                                       Pt seen and discussed with fellow and/or chief resident and staff surgeon.     Gil Cason DO MBA  [facility]  PGY-1 Resident, General Surgery

## 2025-06-15 ENCOUNTER — APPOINTMENT (OUTPATIENT)
Dept: GENERAL RADIOLOGY | Facility: CLINIC | Age: 25
End: 2025-06-15
Attending: STUDENT IN AN ORGANIZED HEALTH CARE EDUCATION/TRAINING PROGRAM
Payer: COMMERCIAL

## 2025-06-15 ENCOUNTER — APPOINTMENT (OUTPATIENT)
Dept: GENERAL RADIOLOGY | Facility: CLINIC | Age: 25
End: 2025-06-15
Payer: COMMERCIAL

## 2025-06-15 VITALS
HEIGHT: 73 IN | TEMPERATURE: 98.3 F | WEIGHT: 148 LBS | HEART RATE: 77 BPM | SYSTOLIC BLOOD PRESSURE: 106 MMHG | DIASTOLIC BLOOD PRESSURE: 61 MMHG | OXYGEN SATURATION: 98 % | RESPIRATION RATE: 18 BRPM | BODY MASS INDEX: 19.61 KG/M2

## 2025-06-15 PROCEDURE — 250N000011 HC RX IP 250 OP 636

## 2025-06-15 PROCEDURE — 250N000013 HC RX MED GY IP 250 OP 250 PS 637

## 2025-06-15 PROCEDURE — 71045 X-RAY EXAM CHEST 1 VIEW: CPT | Mod: 26 | Performed by: RADIOLOGY

## 2025-06-15 PROCEDURE — 71045 X-RAY EXAM CHEST 1 VIEW: CPT | Mod: 77

## 2025-06-15 PROCEDURE — 71045 X-RAY EXAM CHEST 1 VIEW: CPT

## 2025-06-15 RX ORDER — FUROSEMIDE 20 MG/1
20 TABLET ORAL ONCE
Status: DISCONTINUED | OUTPATIENT
Start: 2025-06-15 | End: 2025-06-15

## 2025-06-15 RX ORDER — FUROSEMIDE 20 MG/1
20 TABLET ORAL DAILY
Status: DISCONTINUED | OUTPATIENT
Start: 2025-06-15 | End: 2025-06-15

## 2025-06-15 RX ORDER — OXYCODONE HYDROCHLORIDE 5 MG/1
5-10 TABLET ORAL EVERY 4 HOURS PRN
Qty: 40 TABLET | Refills: 0 | Status: SHIPPED | OUTPATIENT
Start: 2025-06-15

## 2025-06-15 RX ORDER — OXYCODONE HYDROCHLORIDE 5 MG/1
5 TABLET ORAL EVERY 4 HOURS PRN
Qty: 30 TABLET | Refills: 0 | Status: SHIPPED | OUTPATIENT
Start: 2025-06-15 | End: 2025-06-15

## 2025-06-15 RX ADMIN — SENNOSIDES AND DOCUSATE SODIUM 1 TABLET: 50; 8.6 TABLET ORAL at 08:22

## 2025-06-15 RX ADMIN — ACETAMINOPHEN 975 MG: 325 TABLET, FILM COATED ORAL at 02:43

## 2025-06-15 RX ADMIN — METHOCARBAMOL 1000 MG: 500 TABLET ORAL at 08:22

## 2025-06-15 RX ADMIN — ACETAMINOPHEN 975 MG: 325 TABLET, FILM COATED ORAL at 08:59

## 2025-06-15 RX ADMIN — IBUPROFEN 600 MG: 600 TABLET, FILM COATED ORAL at 06:15

## 2025-06-15 RX ADMIN — FAMOTIDINE 20 MG: 20 TABLET, FILM COATED ORAL at 08:22

## 2025-06-15 RX ADMIN — ENOXAPARIN SODIUM 40 MG: 40 INJECTION SUBCUTANEOUS at 08:24

## 2025-06-15 RX ADMIN — POLYETHYLENE GLYCOL 3350 17 G: 17 POWDER, FOR SOLUTION ORAL at 08:24

## 2025-06-15 ASSESSMENT — ACTIVITIES OF DAILY LIVING (ADL)
ADLS_ACUITY_SCORE: 35

## 2025-06-15 NOTE — PLAN OF CARE
Goal Outcome Evaluation:      Plan of Care Reviewed With: patient    Overall Patient Progress: no changeOverall Patient Progress: no change  6/9: Thoracoscopic Blebectomy, Pleurodesis.   Neuro: AOx4, able to make need to known  Cardiac: WDL, denies chest pain  Respiratory: on RA, denies SOB  GI/: voiding adequately, passing flatus, no BM  Diet/Appetite: regular, denies N/V  Skin: left chest incision to primapore  LDA: (R) PIV infusing PCA dilaudid, left chest tube to  waterseal , no air eak  Activity: up ad luis  Pain: Left incision site managed with PCA dilaudid 0.2 mg q10, no basal rate, schedule Tylenol & Ibuprofen  Plan: CXR in AM, pain management, continue POC

## 2025-06-15 NOTE — PROGRESS NOTES
Brief Surgery Crossover Note    Called on call radiology team to review results of post chest tube pull CXR while awaiting final radiology read, radiology team stated the left apical pneumothorax is stable and there is no new pneumothorax present. Signed discharge order per primary thoracic team signout. Discussed with thoracic fellow.     Heri Johnson MD  General Surgery PGY-1    **For on call schedules and contact information, please refer to [facility]**

## 2025-06-15 NOTE — DISCHARGE SUMMARY
NAME: Trevor Cardenas   MRN: [id number]   : 2000     DATE OF ADMISSION: 2025     PRE/POSTOPERATIVE DIAGNOSES: Spontaneous left pneumothorax    PROCEDURES PERFORMED:   Left thoracoscopic blebectomy, pleurodesis   Bedside left pigtail placement, talc pleurodesis     PATHOLOGY RESULTS: In process at the time of discharge     CULTURE RESULTS: None     INTRAOPERATIVE COMPLICATIONS: None     POSTOPERATIVE MEDICAL ISSUES: None     DRAINS/TUBES PRESENT AT DISCHARGE: None    DATE OF DISCHARGE:  6/15/2025    HOSPITAL COURSE: Trevor Cardenas is a 24 year old male who on 2025 underwent the above-named procedures. He tolerated the operation well and postoperatively was transferred to the general post-surgical unit. His surgical chest tube was removed on  after CXR showed no pneumothorax and there was no air leak on exam. Post pull CXR demonstrated a moderate sized pneumothorax, likely secondary to nonocclusive dressing placed after leakage from chest tube site. This did slightly decrease with aggressive pulmonary hygiene/IS, but was persistent on the following day's CXR. Thus a left sided pigtail was placed on  and this was used for talc pleurodesis. The chest tube was placed on suction for 48 hours, placed to water seal for 24 hours and then was clamped. After successful four hour clamp trial, his chest tube was removed. Post pull CXR was stable and patient was cleared for discharge. Prior to discharge, his pain was controlled well, he was able to perform ADLs and ambulate independently without difficulty, and had full return of bowel and bladder function.  On 6/15/2025, he was discharged to home in stable condition.    DISCHARGE EXAM:   Gen:            No acute distress  CV:              Regular rate and rhythm   Pulm:         NWOB on RA, CT site dressings in place    DISCHARGE INSTRUCTIONS:  Discharge Procedure Orders   Hospital to Primary Care - Establish PCP Referral    Standing Status: Future   Referral Priority: Routine: Next available opening Referral Type: Consultation   Number of Visits Requested: 1     Reason for your hospital stay   Order Comments: Blebectomy and pleurodesis     Activity   Order Comments: THORACIC SURGERY DISCHARGE INSTRUCTIONS    Activity as tolerated, no strenous activity until seen in follow-up, no lifting greater than 20 pounds for the next 4 weeks.     Order Specific Question Answer Comments   Is discharge order? Yes      Reason for your hospital stay   Order Comments: Surgery     ADULT [facility] Specialty Follow-up and recommended labs and tests   Order Comments: 1.) Follow up with primary care physician, No Ref-Primary, Physician, in 1-2 weeks.  2.) Thoracic surgery follow up;    7/11/2025  Xray General  2:05 PM  (20 min.)  [facility]  [facility]  Center Xray [place]    Return Patient  2:45 PM  (45 min.)  Shona Guevara APRN [facility]  Cancer Clinic      Appointments on [place] and/or [facility] (with [facility] or [facility] provider or service). Call [phone] if you haven't heard regarding these appointments within 7 days of discharge.     Discharge Instructions   Order Comments: THORACIC SURGERY DISCHARGE INSTRUCTIONS    DIET: Regular diet as tolerated    If your plans upon discharge include prolonged periods of sitting (i.e a lengthy car or plane ride), it is highly beneficial to get up and walk at least once per hour to help prevent swelling and blood clots.     You may remove chest tube dressing 48 hours after tube removal (6/17) and bandage the site at your own discretion thereafter.  Small amounts of leakage are normal for 2-3 days after removal.  Feel free to call with questions.    You may get incision wet in the shower 2 days after operation. Do not submerge, soak, or scrub incision or swim until seen in follow-up.    Take incentive spirometer home for continued frequent use    Activity as  "tolerated, no strenous activity until seen in follow-up, no lifting greater than 20 pounds for the next 4 weeks.    Stay hydrated. Take over the counter fiber (metamucil or benefiber) and stool softeners (Miralax, docusate or senna) if becoming constipated.     Call for fever greater than 101.5, chills, increased size of incision, red skin around incision, vision changes, muscle strength changes, sensation changes, shortness of breath, or other concerns.    No driving while taking narcotic pain medication.    Transition to ibuprofen or tylenol/acetaminophen for pain control. Do not take tylenol/acetaminophen and acetaminophen containing narcotic (e.g., percocet or vicodin) at the same time. If you have known ulcer problems, or kidney trouble (elevated creatinine) do not take the ibuprofen.    If you think you will need a refill on your pain medications, you should call the thoracic surgery team at the number below at least 24hrs prior to running out.  It is also more difficult to provide refills Friday afternoon and over the weekend, so call before those times if possible.     In emergencies, call 911    For other Questions or Concerns;   A.) During weekday working hours (Monday through Friday 8am to 4:30pm)   call [phone] and ask to speak to a thoracic surgery nurse (RN or LPN).     B.) At nights (after 4:30pm), on weekends, or if urgent call [phone] and   tell the  \"I would like to page job code [phone], the thoracic surgery   fellow on call, please.\"     Diet   Order Comments: Follow this diet upon discharge: regular     Order Specific Question Answer Comments   Is discharge order? Yes        DISCHARGE MEDICATIONS:   Discharge Medication List as of 6/15/2025  4:55 PM        START taking these medications    Details   acetaminophen (TYLENOL) 325 MG tablet Take 3 tablets (975 mg) by mouth every 6 hours., Disp-300 tablet, R-0, E-Prescribe      bisacodyl (DULCOLAX) 10 MG suppository Place 1 " suppository (10 mg) rectally daily as needed for constipation (Use if magnesium hydroxide (MILK of MAGNESIA) is not effective after 24 hours. May discontinue if patient having bowel movement.)., Disp-10 suppository, R-0, E-Prescribe      hydrOXYzine HCl (ATARAX) 10 MG tablet Take 3-5 tablets (30-50 mg) by mouth every 6 hours as needed for itching, anxiety or other (pain)., Disp-15 tablet, R-0, E-Prescribe      ibuprofen (ADVIL/MOTRIN) 600 MG tablet Take 1 tablet (600 mg) by mouth every 6 hours., Disp-100 tablet, R-0, E-Prescribe      Lidocaine (LIDOCARE) 4 % Patch Place 1 patch over 12 hours onto the skin every 24 hours. To prevent lidocaine toxicity, patient should be patch free for 12 hrs daily.Disp-10 patch, [id number]      magnesium hydroxide (MILK OF MAGNESIA) 400 MG/5ML suspension Take 30 mLs by mouth daily as needed for constipation (Use if polyethylene glycol (Miralax) is not effective after 24 hours.)., Disp-354 mL, R-0, E-Prescribe      methocarbamol 1000 MG TABS Take 1,000 mg by mouth 3 times daily for 15 days., Disp-45 tablet, R-0, E-Prescribe      ondansetron (ZOFRAN ODT) 4 MG ODT tab Take 1 tablet (4 mg) by mouth every 6 hours as needed for nausea or vomiting., Disp-20 tablet, R-0, E-Prescribe      polyethylene glycol (MIRALAX) 17 GM/Dose powder Take 17 g by mouth daily., Disp-510 g, R-0, E-Prescribe      senna-docusate (SENOKOT-S/PERICOLACE) 8.6-50 MG tablet Take 1 tablet by mouth 2 times daily., Disp-30 tablet, R-0, E-Prescribe           CONTINUE these medications which have CHANGED    Details   oxyCODONE (ROXICODONE) 5 MG tablet Take 1-2 tablets (5-10 mg) by mouth every 4 hours as needed for moderate pain., Disp-40 tablet, R-0, Local Print

## 2025-06-15 NOTE — PROVIDER NOTIFICATION
[place] paged Dr. FERNANDES, Trevor Adame 2000  Hi, Dr. Ganesh Topete was rounding earlier and said Lasix 20mg order can be discontinued. I noticed it was preordered. Please confirm if we can give it to pt or not? Thanks, RN  [phone]    Response: med discontinued

## 2025-06-15 NOTE — DISCHARGE SUMMARY
"/61 (BP Location: Left arm)   Pulse 77   Temp 98.3  F (36.8  C) (Oral)   Resp 18   Ht 1.854 m (6' 1\")   Wt 67.1 kg (148 lb)   SpO2 98%   BMI 19.53 kg/m      4989-8259    Patient A&Ox4, on room air, independent, regular diet, afebrile, VSS. Voiding spontaneously, no BM today. PIV removed, discharge instructions given to patient and his father, they verbalized their understanding. Follow-up check-up on July 11th. Patient ambulated to the [employer] and his father drove him home. Continue POC.   "

## 2025-06-15 NOTE — PROGRESS NOTES
"Brief Surgery Crossover Note    Followed up morning CXR, chest tube clamped for >4 hours, placed to waterseal to assess for air leak, air leak absent with coughing and holding breath. Applied occlusive dressing to incision and chest tube insertion site prior to removal. Post-chest tube pull patient did not have shortness of breath or chest pain, continued to be hemodynamically normal. Will follow-up post-pull CXR.     /61 (BP Location: Left arm)   Pulse 77   Temp 98.3  F (36.8  C) (Oral)   Resp 18   Ht 1.854 m (6' 1\")   Wt 67.1 kg (148 lb)   SpO2 98%   BMI 19.53 kg/m      Discussed with thoracic fellow, Dr. Michelle.     Heri Johnson MD  General Surgery PGY-1    **For on call schedules and contact information, please refer to [facility]**      "

## 2025-06-15 NOTE — PROGRESS NOTES
Thoracic Surgery Progress Note    PATIENT NAME: Trevor Cardenas  MRN: [id number]  DATE: 06/15/2025 9:21 AM  LAST PROCEDURE: PLEURODESIS, THORACOSCOPIC, Blebectomy, Partial Pleurectomy: [id number]  6/9/2025    Subjective:  No acute events overnight. No shortness of breath. Pain minimal, states he would be okay without the PCA. Tolerating diet, passing gas.     Objective:  Vital Signs: Most Recent  Ranges (24 hours)   Temp: 97.8  F (36.6  C)  Pulse: 77  BP: 110/61  Resp: 16  SpO2: 97 % on None (Room air) Temp  Min: 97.7  F (36.5  C)  Max: 98.5  F (36.9  C)  Pulse  Min: 77  Max: 77  BP  Min: 106/62  Max: 112/60  Resp  Min: 16  Max: 18  SpO2  Min: 97 %  Max: 98 %     Physical Exam:  Gen: No acute distress  CV: Regular rate and rhythm   Pulm: NWOB on RA, CT in place to water seal with serosanguinous output, No air leak present      Other Data:  CXR: RALPH stable line, trace apical PTX      Assessment/Plan:  Trevor Cardenas is a 24 year old male with h/o left spontaneous pneumothorax s/p 6/9 thoracoscopic blebectomy, pleurodesis. Pleural tube was removed 6/11 with post-pull moderate sized pneumothorax, likely secondary to nonocclusive dressing placed after leakage from chest tube site. New pigtail placed 6/12 with talc pleurodesis and suction 6/12-6/14.     CXR stable this AM after being on waterseal for 24 hours.     - Clamp CT this morning (0800)  - CXR after 4 hours  - If CXR stable, will pull CT and if post pull CXR stable okay for discharge home  - Discontinue PCA  - Multimodal pain control  - Reg diet  - Lovenox ppx    Seen with fellow, discussed with staff.    Colette Ashford  PGY-3 General Surgery

## 2025-06-15 NOTE — PLAN OF CARE
"/61 (BP Location: Left arm)   Pulse 77   Temp 98.3  F (36.8  C) (Oral)   Resp 18   Ht 1.854 m (6' 1\")   Wt 67.1 kg (148 lb)   SpO2 98%   BMI 19.53 kg/m      Cares from: 0700 - 1530    VS & Pain: VSS. Minimal pain at chest tube area this AM, managed with scheduled Tylenol & Robaxin-- effective.   Neuro: AxO x4  Respiratory: diminished left lung sounds  Cardiac: wdl  Peripheral neurovascular: wdl  GI/: + bowel sounds. No BM this shift. Voids spont in toilet   Nutrition: regular diet. Good appetite. Denied nausea & vomiting   Skin: left chest incision, covered with dressing. Chest tube removed by the team  Musculoskeletal: wdl  Lines: R. PIV- SL  Activity: up ad luis    Plan: continue plan of care      Goal Outcome Evaluation:  Plan of Care Reviewed With: patient  Overall Patient Progress: improving        "

## 2025-06-15 NOTE — PLAN OF CARE
"/62 (BP Location: Left arm)   Pulse 77   Temp 98.5  F (36.9  C) (Oral)   Resp 16   Ht 1.854 m (6' 1\")   Wt 67.1 kg (148 lb)   SpO2 97%   BMI 19.53 kg/m      9500-0735    Patient A&Ox4, on room air, independent, regular diet tolerated well, afebrile, VSS. Pain controlled with scheduled meds, PCA used 2 times. Voiding spontaneously, on BM this shift. Chest tube has 130 mL output to water seal, dressing placed over insertion site. Continue POC.     "

## 2025-06-16 ENCOUNTER — PATIENT OUTREACH (OUTPATIENT)
Dept: CARE COORDINATION | Facility: CLINIC | Age: 25
End: 2025-06-16
Payer: COMMERCIAL

## 2025-06-16 NOTE — PROGRESS NOTES
[facility]: Transitions of Care Outreach  Chief Complaint   Patient presents with    Clinic Care Coordination - Post Hospital       Most Recent Admission Date: 6/9/2025   Most Recent Admission Diagnosis: Primary spontaneous pneumothorax - J93.11  Spontaneous pneumothorax - J93.83     Most Recent Discharge Date: 6/15/2025   Most Recent Discharge Diagnosis: Primary spontaneous pneumothorax - J93.11  Spontaneous pneumothorax - J93.83     Transitions of Care Assessment    Discharge Assessment  How are you doing now that you are home?: Writer spoke to patient who reports that things are good, no questions or concerns at this time.  How are your symptoms? (Red Flag symptoms escalate to triage hotline per guidelines): Improved  Do you know how to contact your clinic care team if you have future questions or changes to your health status? : Yes  Does the patient have their discharge instructions? : Yes  Does the patient have questions regarding their discharge instructions? : No  Does the patient have all of their medications?: Yes  Do you have questions regarding any of your medications? : No  Do you have all of your needed medical supplies or equipment (DME)?  (i.e. oxygen tank, CPAP, cane, etc.): Yes             Follow up Plan   Hospital to Primary Care - Establish PCP Referral   Discharge Follow-Up  Discharge follow up appointment scheduled in alignment with recommended follow up timeframe or Transitions of Risk Category? (Low = within 30 days; Moderate= within 14 days; High= within 7 days): Yes  Discharge Follow Up Appointment Date: 07/11/25  Discharge Follow Up Appointment Scheduled with?: Specialty Care Provider    Future Appointments   Date Time Provider Department Center   7/11/2025  2:20 PM [id number]   7/11/2025  3:00 PM Shona Guevara APRN CNS ONS [facility]       Outpatient Plan as outlined on AVS reviewed with patient.    For any urgent concerns, please contact our 24  hour nurse triage line: [phone] ([phone])       HAYDEN Lemon (she/her/hers)  Social Work Clinic Care Coordinator   [facility]  [email or web address]  [phone]

## 2025-06-17 ENCOUNTER — PATIENT OUTREACH (OUTPATIENT)
Dept: SURGERY | Facility: CLINIC | Age: 25
End: 2025-06-17
Payer: COMMERCIAL

## 2025-06-17 NOTE — PROGRESS NOTES
"Post Op Discharge Call    Surgery: 6/9 Left thoracoscopic blebectomy, pleurodesis  6/12 Bedside left pigtail placement, talc pleurodesis     Surgery date: 6/9/25    Discharge Date:  6/15/25    Immediate Concerns: None at this time. Pt states \"I think I'm recovering pretty well\". No SOB or breathing difficulty.    Pain:  No concerns with pain management. Taking tylenol at night. Not taking oxycodone. Feels that pain is well managed with just tylenol. Does have some pain upon waking but this resolves after moving around.     Incision:   Has not yet removed hospital bandaging but plans to do this tonight. Wound care reviewed. Call or mychart with questions. Afebrile since returning home.     Drains:   No drain.    Diet:   Regular diet     Bowels:   Passing gas. Having normal daily BM.    Activity:   No difficulty with ADLs reported.     Post op/follow up plans:   Post op appointment scheduled,confirmed date and time with patient. Pt encouraged to schedule hospital follow up with PCP in 1-2 weeks.      Future Appointments   Date Time Provider Department [place]   7/11/2025  2:20 PM [facility] [place] [address]   7/11/2025  3:00 PM Shona Guevara APRN [facility]         Patient has our direct number for any questions or concerns that may arise.      Arpita Escobar RN BSN  Thoracic Surgery RN Care Coordinator  Phone: [phone]           "

## 2025-06-20 NOTE — OP NOTE
Procedure Date: 6/20/25     PREOPERATIVE DIAGNOSIS:  Recurrent left pneumothorax.     POSTOPERATIVE DIAGNOSIS:  Recurrent left pneumothorax.     PROCEDURE PERFORMED:  Thoracoscopic left blebectomy and mechanical pleurodesis.     OPERATIVE FINDINGS:  blebs in the left upper lobe.     OPERATING SURGEON:  Ale Eddy MD          DESCRIPTION OF PROCEDURE:  After obtaining informed consent, the patient was brought to the operating room and laid supine on the operating table.  After induction of general anesthesia and introduction of the double-lumen endotracheal tube, the patient was positioned in the right lateral decubitus with the left side up.  We then proceeded with a 2-port VATS approach with one port in the eighth intercostal space and one utility port in the left third intercostal space.  Upon entering the chest cavity, there were some adhesions of the blebs to the chest wall, which we took down.  We then took a large wedge of the left upper lobe using a staple load to perform a blebectomy.  After this, we proceeded with mechanical pleurodesis using a Bovie scratch pad to abrade the parietal pleura. Hemostasis was ensured.  A 28-Iraqi straight chest tube was left in the pleural cavity.  The intercostal spaces were injected with bupivacaine.  The port sites were closed in layers.  The patient tolerated the procedure well.     Ale Eddy MD

## 2025-07-11 ENCOUNTER — ANCILLARY PROCEDURE (OUTPATIENT)
Dept: GENERAL RADIOLOGY | Facility: CLINIC | Age: 25
End: 2025-07-11
Attending: CLINICAL NURSE SPECIALIST
Payer: COMMERCIAL

## 2025-07-11 DIAGNOSIS — J93.11 PRIMARY SPONTANEOUS PNEUMOTHORAX: ICD-10-CM

## 2025-07-11 PROCEDURE — 71046 X-RAY EXAM CHEST 2 VIEWS: CPT | Mod: GC | Performed by: RADIOLOGY

## (undated) DEVICE — LINEN TOWEL PACK X5 5464

## (undated) DEVICE — DRAIN CHEST TUBE 28FR STR 8028

## (undated) DEVICE — ESU ELEC BLADE 6" COATED/INSULATED E1455-6

## (undated) DEVICE — KIT ENDO TURNOVER/PROCEDURE CARRY-ON 101822

## (undated) DEVICE — TUBING SMOKE EVAC PNEUMOCLEAR HIGH FLOW 0620050250

## (undated) DEVICE — SOL WATER IRRIG 500ML BOTTLE 2F7113

## (undated) DEVICE — DRSG STERI STRIP 1/2X4" R1547

## (undated) DEVICE — SU SILK 0 TIE 6X30" A306H

## (undated) DEVICE — Device

## (undated) DEVICE — SUCTION DRY CHEST DRAIN OASIS 3600-100

## (undated) DEVICE — ESU ELEC BLADE 2.75" COATED/INSULATED E1455

## (undated) DEVICE — SUCTION CATH AIRLIFE TRI-FLO W/CONTROL PORT 14FR  T60C

## (undated) DEVICE — SPONGE TONSIL W/STRING MED 23275-680

## (undated) DEVICE — SUCTION MANIFOLD NEPTUNE 2 SYS 4 PORT 0702-020-000

## (undated) DEVICE — ENDO SCOPE WARMER SEAL  C3101

## (undated) DEVICE — DRSG DRAIN 2X2" 7087

## (undated) DEVICE — SYR 30ML SLIP TIP W/O NDL 302833

## (undated) DEVICE — ESU PENCIL SMOKE EVAC W/ROCKER SWITCH 0703-047-000

## (undated) DEVICE — SU VICRYL+ 0 27 UR6 VLT VCP603H

## (undated) DEVICE — ENDO BITE BLOCK ADULT OMNI-BLOC

## (undated) DEVICE — SU VICRYL 2-0 SH 27" UND J417H

## (undated) DEVICE — DRSG PRIMAPORE 03 1/8X6" 66000318

## (undated) DEVICE — DRSG STERI STRIP 1/4X4" R1546

## (undated) DEVICE — ESU GROUND PAD ADULT W/CORD E7507

## (undated) DEVICE — STPL RELOAD REG TISSUE ECHELON 45 X 3.6MM BLUE GST45B

## (undated) DEVICE — STPL ENDO ARTICULATING 45MM EC45A

## (undated) DEVICE — LINEN TOWEL PACK X6 WHITE 5487

## (undated) DEVICE — PREP CHLORAPREP 26ML TINTED HI-LITE ORANGE 930815

## (undated) DEVICE — LINEN GOWN XLG 5407

## (undated) DEVICE — ESU CLEANER TIP 31142717

## (undated) DEVICE — SU SILK 0 SH 30" K834H

## (undated) DEVICE — DRSG PRIMAPORE 02X3" 7133

## (undated) DEVICE — SU MONOCRYL 4-0 PS-2 27" UND Y426H

## (undated) DEVICE — SYR 50ML LL W/O NDL 309653

## (undated) DEVICE — DRAPE IOBAN INCISE 23X17" 6650EZ

## (undated) DEVICE — ADH LIQUID MASTISOL TOPICAL VIAL 2-3ML 0523-48

## (undated) DEVICE — GOWN IMPERVIOUS 2XL BLUE

## (undated) DEVICE — GLOVE PROTEXIS MICRO 6.0 LT BLUE 2D73PM60

## (undated) DEVICE — SUCTION MANIFOLD NEPTUNE 2 SYS 1 PORT 702-025-000

## (undated) DEVICE — KIT ENDO FIRST STEP DISINFECTANT 200ML W/POUCH EP-4

## (undated) RX ORDER — ACETAMINOPHEN 325 MG/1
TABLET ORAL
Status: DISPENSED
Start: 2025-06-09

## (undated) RX ORDER — METHOCARBAMOL 500 MG/1
TABLET, FILM COATED ORAL
Status: DISPENSED
Start: 2025-06-09

## (undated) RX ORDER — ONDANSETRON 2 MG/ML
INJECTION INTRAMUSCULAR; INTRAVENOUS
Status: DISPENSED
Start: 2025-06-09

## (undated) RX ORDER — HYDROMORPHONE HYDROCHLORIDE 1 MG/ML
INJECTION, SOLUTION INTRAMUSCULAR; INTRAVENOUS; SUBCUTANEOUS
Status: DISPENSED
Start: 2025-06-09

## (undated) RX ORDER — FENTANYL CITRATE-0.9 % NACL/PF 10 MCG/ML
PLASTIC BAG, INJECTION (ML) INTRAVENOUS
Status: DISPENSED
Start: 2025-06-09

## (undated) RX ORDER — HYDROMORPHONE HCL IN WATER/PF 6 MG/30 ML
PATIENT CONTROLLED ANALGESIA SYRINGE INTRAVENOUS
Status: DISPENSED
Start: 2025-06-09

## (undated) RX ORDER — PROPOFOL 10 MG/ML
INJECTION, EMULSION INTRAVENOUS
Status: DISPENSED
Start: 2025-06-09

## (undated) RX ORDER — CEFAZOLIN SODIUM/WATER 2 G/20 ML
SYRINGE (ML) INTRAVENOUS
Status: DISPENSED
Start: 2025-06-09

## (undated) RX ORDER — BUPIVACAINE HYDROCHLORIDE AND EPINEPHRINE 2.5; 5 MG/ML; UG/ML
INJECTION, SOLUTION EPIDURAL; INFILTRATION; INTRACAUDAL; PERINEURAL
Status: DISPENSED
Start: 2025-06-09

## (undated) RX ORDER — HEPARIN SODIUM 5000 [USP'U]/.5ML
INJECTION, SOLUTION INTRAVENOUS; SUBCUTANEOUS
Status: DISPENSED
Start: 2025-06-09

## (undated) RX ORDER — FENTANYL CITRATE 50 UG/ML
INJECTION, SOLUTION INTRAMUSCULAR; INTRAVENOUS
Status: DISPENSED
Start: 2025-06-09

## (undated) RX ORDER — OXYCODONE HYDROCHLORIDE 10 MG/1
TABLET ORAL
Status: DISPENSED
Start: 2025-06-09

## (undated) RX ORDER — DEXAMETHASONE SODIUM PHOSPHATE 4 MG/ML
INJECTION, SOLUTION INTRA-ARTICULAR; INTRALESIONAL; INTRAMUSCULAR; INTRAVENOUS; SOFT TISSUE
Status: DISPENSED
Start: 2025-06-09